# Patient Record
Sex: MALE | Race: WHITE | Employment: OTHER | URBAN - METROPOLITAN AREA
[De-identification: names, ages, dates, MRNs, and addresses within clinical notes are randomized per-mention and may not be internally consistent; named-entity substitution may affect disease eponyms.]

---

## 2017-01-17 ENCOUNTER — TELEPHONE (OUTPATIENT)
Dept: ORTHOPEDIC SURGERY | Age: 67
End: 2017-01-17

## 2017-01-17 NOTE — TELEPHONE ENCOUNTER
Patient missed appointment today because he got lost. [place]. Called to reschedule but he did not want to since [place] is going out of the country soon. He would like a refill of Hydrocodone 10/500. Patient not sure if he is receiving this from Dr. Fatimah Hernandez or pain management. Please call patient at [phone].

## 2017-01-18 NOTE — TELEPHONE ENCOUNTER
We do not give him pain medicine. If he is in pain management, then that is where he should get all his pain medication.

## 2017-01-18 NOTE — TELEPHONE ENCOUNTER
Looking at patients medication list it looks like medication was added as a historical medication meaning Dr. Ana Carvalho did not prescribe. Please advise.

## 2017-01-18 NOTE — TELEPHONE ENCOUNTER
Called patient to inform him of the message below. Informed him that he will need to get main medications from pain management.

## 2017-05-09 ENCOUNTER — OFFICE VISIT (OUTPATIENT)
Dept: ORTHOPEDIC SURGERY | Age: 67
End: 2017-05-09

## 2017-05-09 VITALS
SYSTOLIC BLOOD PRESSURE: 137 MMHG | OXYGEN SATURATION: 95 % | TEMPERATURE: 98.5 F | RESPIRATION RATE: 16 BRPM | HEIGHT: 72 IN | HEART RATE: 89 BPM | BODY MASS INDEX: 29.17 KG/M2 | DIASTOLIC BLOOD PRESSURE: 84 MMHG | WEIGHT: 215.4 LBS

## 2017-05-09 DIAGNOSIS — G89.29 CHRONIC BILATERAL LOW BACK PAIN WITH BILATERAL SCIATICA: ICD-10-CM

## 2017-05-09 DIAGNOSIS — N52.9 ERECTILE DYSFUNCTION, UNSPECIFIED ERECTILE DYSFUNCTION TYPE: ICD-10-CM

## 2017-05-09 DIAGNOSIS — M54.42 CHRONIC BILATERAL LOW BACK PAIN WITH BILATERAL SCIATICA: ICD-10-CM

## 2017-05-09 DIAGNOSIS — M41.50 DEGENERATIVE SCOLIOSIS IN ADULT PATIENT: ICD-10-CM

## 2017-05-09 DIAGNOSIS — M54.41 CHRONIC BILATERAL LOW BACK PAIN WITH BILATERAL SCIATICA: ICD-10-CM

## 2017-05-09 DIAGNOSIS — M48.061 LUMBAR SPINAL STENOSIS: Primary | ICD-10-CM

## 2017-05-09 DIAGNOSIS — R20.2 PARESTHESIA OF LEFT LOWER EXTREMITY: ICD-10-CM

## 2017-05-09 RX ORDER — PSEUDOEPHEDRINE HCL 120 MG
TABLET, EXTENDED RELEASE ORAL
Refills: 6 | COMMUNITY
Start: 2017-05-01 | End: 2019-09-24 | Stop reason: ALTCHOICE

## 2017-05-09 RX ORDER — TADALAFIL 5 MG/1
TABLET, FILM COATED ORAL
Refills: 0 | COMMUNITY
Start: 2017-04-26 | End: 2019-09-24 | Stop reason: ALTCHOICE

## 2017-05-09 NOTE — MR AVS SNAPSHOT
Visit Information Date & Time Provider Department Dept. Phone Encounter #  
 5/9/2017  9:10 AM Madisyn Hussein  [address], [address] [facility] [phone] [id number] Follow-up Instructions Follow-up and Disposition History Your Appointments 6/9/2017 10:00 AM  
SURGERY CONSULT with Madisyn Hussein MD  
[address], [address] [facility]) Appt Note: last seen 9/2016 - discussed sx, pt fell other day, severe back pain; pt rsd to this date [address] [facility] [address]  
[phone]  
  
   
 [address] [facility] [address] Upcoming Health Maintenance Date Due Hepatitis C Screening 1950 DTaP/Tdap/Td series (1 - Tdap) 2/21/1972 FOBT Q 1 YEAR AGE 50-75 2/21/2001 ZOSTER VACCINE AGE 60> 2/21/2011 GLAUCOMA SCREENING Q2Y 2/21/2016 Pneumococcal 65+ Low/Medium Risk (1 of 2 - PCV13) 2/21/2016 MEDICARE YEARLY EXAM 2/21/2016 INFLUENZA AGE 9 TO ADULT 8/1/2017 Allergies as of 5/9/2017  Review Complete On: 5/9/2017 By: Madisyn Hussein MD  
  
 Severity Noted Reaction Type Reactions Darvon [Propoxyphene]  12/10/2012    Nausea and Vomiting Current Immunizations  Never Reviewed No immunizations on file. Not reviewed this visit You Were Diagnosed With   
  
 Codes Comments Lumbar spinal stenosis    -  Primary ICD-10-CM: M48.06 
ICD-9-CM: 724.02 Chronic bilateral low back pain with bilateral sciatica     ICD-10-CM: M54.42, M54.41, G89.29 ICD-9-CM: 724.2, 724.3, 338.29 Degenerative scoliosis in adult patient     ICD-10-CM: M41.50 ICD-9-CM: 733.90, 737.43 Paresthesia of left lower extremity     ICD-10-CM: R20.2 ICD-9-CM: 782.0 Erectile dysfunction, unspecified erectile dysfunction type     ICD-10-CM: N52.9 ICD-9-CM: 607.84 Vitals BP Pulse Temp Resp Height(growth percentile) Weight(growth percentile) 137/84 89 98.5 °F (36.9 °C) (Oral) 16 6' (1.829 m) 215 lb 6.4 oz (97.7 kg) SpO2 BMI Smoking Status 95% 29.21 kg/m2 Never Smoker BMI and BSA Data Body Mass Index Body Surface Area  
 29.21 kg/m 2 2.23 m 2 Preferred Pharmacy Pharmacy Name Phone [facility], [address] [phone] Your Updated Medication List  
  
   
This list is accurate as of: 5/9/17 11:10 AM.  Always use your most recent med list.  
  
  
  
  
 12 HOUR DECONGESTANT 120 mg CR tablet Generic drug:  pseudoephedrine CR  
TAKE 1 TAB BY MOUTH ONCE A DAY. BENADRYL 25 mg capsule Generic drug:  diphenhydrAMINE Take 25 mg by mouth every six (6) hours as needed. buPROPion  mg SR tablet Commonly known as:  Matias Stagger Take 100 mg by mouth three (3) times daily. CIALIS 5 mg tablet Generic drug:  tadalafil TAKE 1 TAB BY MOUTH ONCE A DAY. clonazePAM 1 mg tablet Commonly known as:  Marcellina Comas Take  by mouth three (3) times daily. HYDROcodone-acetaminophen 5-500 mg Cap Take  by mouth.  
  
 meclizine 25 mg Cap Take  by mouth as needed. omeprazole 40 mg capsule Commonly known as:  PRILOSEC Take 40 mg by mouth two (2) times a day. OXYCODONE PO Take  by mouth. We Performed the Following REFERRAL TO UROLOGY [RSQ070 Custom] To-Do List   
 05/09/2017 Imaging:  CT SPINE LUMB WO CONT   
  
 05/09/2017 Neurology:  EMG ONE EXTREMITY LOWER LT Referral Information Referral ID Referred By Referred To  
  
 [id number] Pan COKER Not Available Visits Status Start Date End Date 1 New Request 5/9/17 5/9/18 If your referral has a status of pending review or denied, additional information will be sent to support the outcome of this decision. Patient Instructions MyChart Activation Thank you for requesting access to Or[employer]. Please follow the instructions below to securely access and download your online medical record. Orgenesis allows you to send messages to your doctor, view your test results, renew your prescriptions, schedule appointments, and more. How Do I Sign Up? 1. In your internet browser, go to [email or web address] 
2. Click on the First Time User? Click Here link in the Sign In box. You will be redirect to the New Member Sign Up page. 3. Enter your Orgenesis Access Code exactly as it appears below. You will not need to use this code after youve completed the sign-up process. If you do not sign up before the expiration date, you must request a new code. Orgenesis Access Code: [email or web address] Expires: 2017  8:33 AM (This is the date your Orgenesis access code will ) 4. Enter the last four digits of your Social Security Number (xxxx) and Date of Birth (mm/dd/yyyy) as indicated and click Submit. You will be taken to the next sign-up page. 5. Create a Orgenesis ID. This will be your Orgenesis login ID and cannot be changed, so think of one that is secure and easy to remember. 6. Create a Orgenesis password. You can change your password at any time. 7. Enter your Password Reset Question and Answer. This can be used at a later time if you forget your password. 8. Enter your e-mail address. You will receive e-mail notification when new information is available in [address] 9. Click Sign Up. You can now view and download portions of your medical record. 10. Click the Download Summary menu link to download a portable copy of your medical information. Additional Information If you have questions, please visit the Frequently Asked Questions section of the Orgenesis website at [email or web address] Remember, Orgenesis is NOT to be used for urgent needs. For medical emergencies, dial 911. Introducing [facility] SERVICES! Yancy Ann introduces AppLearn patient portal. Now you can access parts of your medical record, email your doctor's office, and request medication refills online. 1. In your internet browser, go to [email or web address] 2. Click on the First Time User? Click Here link in the Sign In box. You will see the New Member Sign Up page. 3. Enter your AppLearn Access Code exactly as it appears below. You will not need to use this code after youve completed the sign-up process. If you do not sign up before the expiration date, you must request a new code. · AppLearn Access Code: [email or web address] Expires: 8/7/2017  8:33 AM 
 
4. Enter the last four digits of your Social Security Number (xxxx) and Date of Birth (mm/dd/yyyy) as indicated and click Submit. You will be taken to the next sign-up page. 5. Create a AppLearn ID. This will be your AppLearn login ID and cannot be changed, so think of one that is secure and easy to remember. 6. Create a AppLearn password. You can change your password at any time. 7. Enter your Password Reset Question and Answer. This can be used at a later time if you forget your password. 8. Enter your e-mail address. You will receive e-mail notification when new information is available in [address] 9. Click Sign Up. You can now view and download portions of your medical record. 10. Click the Download Summary menu link to download a portable copy of your medical information. If you have questions, please visit the Frequently Asked Questions section of the AppLearn website. Remember, AppLearn is NOT to be used for urgent needs. For medical emergencies, dial 911. Now available from your iPhone and Android! Please provide this summary of care documentation to your next provider. Your primary care clinician is listed as Tereso Fatima. If you have any questions after today's visit, please call [phone].

## 2017-05-09 NOTE — PATIENT INSTRUCTIONS
Ceon Activation    Thank you for requesting access to Ceon. Please follow the instructions below to securely access and download your online medical record. Ceon allows you to send messages to your doctor, view your test results, renew your prescriptions, schedule appointments, and more. How Do I Sign Up? 1. In your internet browser, go to [email or web address] Assistant  2. Click on the First Time User? Click Here link in the Sign In box. You will be redirect to the New Member Sign Up page. 3. Enter your Ceon Access Code exactly as it appears below. You will not need to use this code after youve completed the sign-up process. If you do not sign up before the expiration date, you must request a new code. Ceon Access Code: [email or web address]  Expires: 2017  8:33 AM (This is the date your Ceon access code will )    4. Enter the last four digits of your Social Security Number (xxxx) and Date of Birth (mm/dd/yyyy) as indicated and click Submit. You will be taken to the next sign-up page. 5. Create a Ceon ID. This will be your Ceon login ID and cannot be changed, so think of one that is secure and easy to remember. 6. Create a Ceon password. You can change your password at any time. 7. Enter your Password Reset Question and Answer. This can be used at a later time if you forget your password. 8. Enter your e-mail address. You will receive e-mail notification when new information is available in [address] 9. Click Sign Up. You can now view and download portions of your medical record. 10. Click the Download Summary menu link to download a portable copy of your medical information. Additional Information    If you have questions, please visit the Frequently Asked Questions section of the Ceon website at [email or web address] Remember, Ceon is NOT to be used for urgent needs. For medical emergencies, dial 911.

## 2017-05-09 NOTE — PROGRESS NOTES
[address]  [address]  [place], [address] [place] [address] Street  Phone: [phone]  Fax: [phone]  PROGRESS NOTE  Patient: Judi Lobato                MRN: [id number]       SSN: [id number]  YOB: 1950        AGE: 78 y.o. SEX: male  Body mass index is 29.21 kg/(m^2). PCP: Marcela Givens MD  05/09/17    Chief Complaint   Patient presents with    Back Pain     back pain eval       HISTORY OF PRESENT ILLNESS, RADIOGRAPHS, and PLAN:     HISTORY:  Mr. Theodora Michaud returns today. He has continued to have severe pain in his back and has complaints of leg symptoms. Now, he says last week, he had an episode of pain in his back that lasted two days with radiating left leg numbness and pain. The numbness is circumferential in his left leg. The pain, he says, left him immobilized for two days in his back. He gradually improved. He denies bowel or bladder dysfunction. His physical exam demonstrates no objective neurology. The CT scan I had ordered last time never occurred. I tried to discuss the matter with him. I reviewed his x-rays. I am not enthusiastic about him being a reconstructive candidate. He has a L4-5 level that is fused. He has a degenerative scoliosis, mostly a coronal imbalance more than a sagittal imbalance. 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 His MRI does not show me any dramatic stenosis at any place. I cannot explain his left leg numbness or anything looking at his spine that would cause such an acute, sudden pain syndrome on him awaking from his bed. My clinical sense is that he would be a poor surgical candidate for any intervention, certainly, a multilevel lumbar decompression and fusion where there is not much that I am decompressing, and I am trying to refuse segments that have nearly autofused themselves.       ASSESSMENT/PLAN: I am going to see him back following an EMG of his left lower extremity, as well as the CT scan I had previously ordered. cc: Kaitlynn Ruth M.D. Past Medical History:   Diagnosis Date    Back pain, chronic     Bilateral hip pain     Colon disorder     Spastic    Gastric ulcer     H/O emotional problems     Herniated disc     Hypertension     Knee pain     Low back pain     Vertigo        History reviewed. No pertinent family history. Current Outpatient Prescriptions   Medication Sig Dispense Refill    CIALIS 5 mg tablet TAKE 1 TAB BY MOUTH ONCE A DAY. 0    OXYCODONE HCL (OXYCODONE PO) Take  by mouth.  buPROPion SR (WELLBUTRIN SR) 100 mg SR tablet Take 100 mg by mouth three (3) times daily.  clonazePAM (KLONOPIN) 1 mg tablet Take  by mouth three (3) times daily.  omeprazole (PRILOSEC) 40 mg capsule Take 40 mg by mouth two (2) times a day.  12 HOUR DECONGESTANT 120 mg CR tablet TAKE 1 TAB BY MOUTH ONCE A DAY. 6    HYDROcodone-acetaminophen 5-500 mg cap Take  by mouth.  meclizine 25 mg cap Take  by mouth as needed.  diphenhydrAMINE (BENADRYL) 25 mg capsule Take 25 mg by mouth every six (6) hours as needed.            Allergies   Allergen Reactions    Darvon [Propoxyphene] Nausea and Vomiting       Past Surgical History:   Procedure Laterality Date    HX BACK SURGERY  06/03/1999    HX BACK SURGERY  05/22/2015    HX HEENT      Nasal    HX KNEE ARTHROSCOPY      Rt knee    HX ORTHOPAEDIC      HX ORTHOPAEDIC      Lt ankle    HX SHOULDER ARTHROSCOPY      Rt shoulder       Past Medical History:   Diagnosis Date    Back pain, chronic     Bilateral hip pain     Colon disorder     Spastic    Gastric ulcer     H/O emotional problems     Herniated disc     Hypertension     Knee pain     Low back pain     Vertigo        Social History     Social History    Marital status:      Spouse name: N/A    Number of children: N/A    Years of education: N/A Occupational History    Not on file. Social History Main Topics    Smoking status: Never Smoker    Smokeless tobacco: Not on file    Alcohol use No    Drug use: Not on file    Sexual activity: Not on file     Other Topics Concern    Not on file     Social History Narrative     REVIEW OF SYSTEMS:   CONSTITUTIONAL SYMPTOMS:  Negative. EYES:  Negative. EARS, NOSE, THROAT AND MOUTH:  Negative. CARDIOVASCULAR:  Negative. RESPIRATORY:  Negative. GENITOURINARY: Negative. GASTROINTESTINAL:  Negative. INTEGUMENTARY (SKIN AND/OR BREAST):  Negative. MUSCULOSKELETAL: Per HPI.   ENDOCRINE/RHEUMATOLOGIC:  Negative. NEUROLOGICAL:  Per HPI. HEMATOLOGIC/LYMPHATIC:  Negative. ALLERGIC/IMMUNOLOGIC:  Negative. PSYCHIATRIC:  Negative. PHYSICAL EXAMINATION:   Visit Vitals    /84    Pulse 89    Temp 98.5 °F (36.9 °C) (Oral)    Resp 16    Ht 6' (1.829 m)    Wt 215 lb 6.4 oz (97.7 kg)    SpO2 95%    BMI 29.21 kg/m2    PAIN SCALE: 8/10    CONSTITUTIONAL: The patient is in no apparent distress and is alert and oriented x 3. HEENT: Normocephalic. Hearing grossly intact. NECK: Supple and symmetric. no tenderness, or masses were felt. RESPIRATORY: No labored breathing. CARDIOVASCULAR: The carotid pulses were normal. Peripheral pulses were 2+. CHEST: Normal AP diameter and normal contour without any kyphoscoliosis. LYMPHATIC: No lymphadenopathy was appreciated in the neck, axillae or groin. SKIN:  Negative for scars, rashes, lesions, or ulcers on the right upper, right lower, left upper, left lower and trunk. NEUROLOGICAL: Alert and oriented x 3. Ambulation without assistive device. FWB. EXTREMITIES: See musculoskeletal.  MUSCULOSKELETAL:   Head and Neck:  Negative for misalignment, asymmetry, crepitation, defects, tenderness masses or effusions.  Left Upper Extremity: Inspection, percussion and palpation preformed. Angeless sign is negative.    Right Upper Extremity: Inspection, percussion and palpation preformed. Angeless sign is negative.  Spine, Ribs and Pelvis: Severe back pain with radiating BLE pain. Inspection, percussion and palpation preformed. Negative for misalignment, asymmetry, crepitation, defects, tenderness masses or effusions.  Left Lower Extremity: Posterior pain. Paresthesia with sitting. Inspection, percussion and palpation preformed. Negative straight leg raise.  Right Lower Extremity: Posterior pain. Inspection, percussion and palpation preformed. Negative straight leg raise. SPINE EXAM:     Lumbar spine: No rash, ecchymosis, or gross obliquity. No focal atrophy is noted. ASSESSMENT    ICD-10-CM ICD-9-CM    1. Lumbar spinal stenosis M48.06 724.02 CT SPINE LUMB WO CONT   2. Chronic bilateral low back pain with sciatica M54.42 724.2 CT SPINE LUMB WO CONT    M54.41 724.3 EMG ONE EXTREMITY LOWER LT    G89.29 338.29    3. Degenerative scoliosis in adult patient M41.50 733.90 CT SPINE LUMB WO CONT     737.43    4. Paresthesia of left lower extremity R20.2 782.0 EMG ONE EXTREMITY LOWER LT       Written by Binu Reynoso, as dictated by Brisa Joe MD.    I, Dr. Brisa Joe MD, confirm that all documentation is accurate.

## 2017-05-22 ENCOUNTER — TELEPHONE (OUTPATIENT)
Dept: ORTHOPEDIC SURGERY | Age: 67
End: 2017-05-22

## 2017-09-06 ENCOUNTER — DOCUMENTATION ONLY (OUTPATIENT)
Dept: ORTHOPEDIC SURGERY | Age: 67
End: 2017-09-06

## 2017-09-19 DIAGNOSIS — M54.42 CHRONIC BILATERAL LOW BACK PAIN WITH BILATERAL SCIATICA: ICD-10-CM

## 2017-09-19 DIAGNOSIS — M48.061 LUMBAR SPINAL STENOSIS: ICD-10-CM

## 2017-09-19 DIAGNOSIS — M54.41 CHRONIC BILATERAL LOW BACK PAIN WITH BILATERAL SCIATICA: ICD-10-CM

## 2017-09-19 DIAGNOSIS — M41.50 DEGENERATIVE SCOLIOSIS IN ADULT PATIENT: ICD-10-CM

## 2017-09-19 DIAGNOSIS — G89.29 CHRONIC BILATERAL LOW BACK PAIN WITH BILATERAL SCIATICA: ICD-10-CM

## 2017-10-03 ENCOUNTER — TELEPHONE (OUTPATIENT)
Dept: ORTHOPEDIC SURGERY | Age: 67
End: 2017-10-03

## 2017-10-03 ENCOUNTER — OFFICE VISIT (OUTPATIENT)
Dept: ORTHOPEDIC SURGERY | Age: 67
End: 2017-10-03

## 2017-10-03 VITALS
WEIGHT: 220.4 LBS | SYSTOLIC BLOOD PRESSURE: 111 MMHG | BODY MASS INDEX: 29.85 KG/M2 | RESPIRATION RATE: 16 BRPM | HEIGHT: 72 IN | TEMPERATURE: 98.5 F | HEART RATE: 82 BPM | OXYGEN SATURATION: 97 % | DIASTOLIC BLOOD PRESSURE: 70 MMHG

## 2017-10-03 DIAGNOSIS — M41.50 DEGENERATIVE SCOLIOSIS IN ADULT PATIENT: ICD-10-CM

## 2017-10-03 DIAGNOSIS — M48.02 CERVICAL SPINAL STENOSIS: ICD-10-CM

## 2017-10-03 DIAGNOSIS — M54.16 LUMBAR RADICULOPATHY: ICD-10-CM

## 2017-10-03 DIAGNOSIS — M48.062 SPINAL STENOSIS OF LUMBAR REGION WITH NEUROGENIC CLAUDICATION: Primary | ICD-10-CM

## 2017-10-03 RX ORDER — GABAPENTIN 300 MG/1
CAPSULE ORAL
Qty: 90 CAP | Refills: 1 | Status: SHIPPED | OUTPATIENT
Start: 2017-10-03 | End: 2018-10-29 | Stop reason: ALTCHOICE

## 2017-10-03 NOTE — PROGRESS NOTES
[address]  [address]  [place], [address] [place] [address] Street  Phone: [phone]  Fax: [phone]  PROGRESS NOTE  Patient: Tyson Parra                MRN: [id number]       SSN: [id number]  YOB: 1950        AGE: 78 y.o. SEX: male  Body mass index is 29.89 kg/(m^2). PCP: George Arriaga MD  10/03/17    No chief complaint on file. HISTORY OF PRESENT ILLNESS, RADIOGRAPHS, and PLAN:     Dictation #1  IGL:[id number]  BLO:[id number]      Past Medical History:   Diagnosis Date    Back pain, chronic     Bilateral hip pain     Colon disorder     Spastic    Erectile dysfunction     Gastric ulcer     H/O emotional problems     Herniated disc     Hypertension     Knee pain     Low back pain     Vertigo        History reviewed. No pertinent family history. Current Outpatient Prescriptions   Medication Sig Dispense Refill    12 HOUR DECONGESTANT 120 mg CR tablet TAKE 1 TAB BY MOUTH ONCE A DAY. 6    CIALIS 5 mg tablet TAKE 1 TAB BY MOUTH ONCE A DAY. 0    OXYCODONE HCL (OXYCODONE PO) Take  by mouth.  HYDROcodone-acetaminophen 5-500 mg cap Take  by mouth.  buPROPion SR (WELLBUTRIN SR) 100 mg SR tablet Take 100 mg by mouth three (3) times daily.  clonazePAM (KLONOPIN) 1 mg tablet Take  by mouth three (3) times daily.  omeprazole (PRILOSEC) 40 mg capsule Take 40 mg by mouth two (2) times a day.  diphenhydrAMINE (BENADRYL) 25 mg capsule Take 25 mg by mouth every six (6) hours as needed.            Allergies   Allergen Reactions    Latex, Natural Rubber Other (comments)    Darvon [Propoxyphene] Nausea and Vomiting    Shellfish Containing Products Unknown (comments)       Past Surgical History:   Procedure Laterality Date    HX BACK SURGERY  06/03/1999    HX BACK SURGERY  05/22/2015    HX HEENT      Nasal    HX KNEE ARTHROSCOPY      Rt knee    HX ORTHOPAEDIC      HX ORTHOPAEDIC      Lt ankle    HX SHOULDER ARTHROSCOPY      Rt shoulder       Past Medical History:   Diagnosis Date    Back pain, chronic     Bilateral hip pain     Colon disorder     Spastic    Erectile dysfunction     Gastric ulcer     H/O emotional problems     Herniated disc     Hypertension     Knee pain     Low back pain     Vertigo        Social History     Social History    Marital status:      Spouse name: N/A    Number of children: N/A    Years of education: N/A     Occupational History    Not on file. Social History Main Topics    Smoking status: Never Smoker    Smokeless tobacco: Never Used    Alcohol use Yes      Comment: rare    Drug use: No    Sexual activity: Not on file     Other Topics Concern    Not on file     Social History Narrative         REVIEW OF SYSTEMS:   CONSTITUTIONAL SYMPTOMS:  Negative. EYES:  Negative. EARS, NOSE, THROAT AND MOUTH:  Negative. CARDIOVASCULAR:  Negative. RESPIRATORY:  Negative. GENITOURINARY: Negative. GASTROINTESTINAL:  Negative. INTEGUMENTARY (SKIN AND/OR BREAST):  Negative. MUSCULOSKELETAL: Per HPI.   ENDOCRINE/RHEUMATOLOGIC:  Negative. NEUROLOGICAL:  Per HPI. HEMATOLOGIC/LYMPHATIC:  Negative. ALLERGIC/IMMUNOLOGIC:  Negative. PSYCHIATRIC:  Negative. PHYSICAL EXAMINATION:   Visit Vitals    /70    Pulse 82    Temp 98.5 °F (36.9 °C) (Oral)    Resp 16    Ht 6' (1.829 m)    Wt 220 lb 6.4 oz (100 kg)    SpO2 97%    BMI 29.89 kg/m2    PAIN SCALE: 9/10    CONSTITUTIONAL: The patient is in no apparent distress and is alert and oriented x 3. HEENT: Normocephalic. Hearing grossly intact. NECK: Supple and symmetric. no tenderness, or masses were felt. RESPIRATORY: No labored breathing. CARDIOVASCULAR: The carotid pulses were normal. Peripheral pulses were 2+. CHEST: Normal AP diameter and normal contour without any kyphoscoliosis. LYMPHATIC: No lymphadenopathy was appreciated in the neck, axillae or groin.   SKIN: . Negative for scars, rashes, lesions, or ulcers on the right upper, right lower, left upper, left lower and trunk. NEUROLOGICAL: Alert and oriented x 3. Ambulation without assistive device. FWB. EXTREMITIES: See musculoskeletal.  MUSCULOSKELETAL:   Head and Neck:  Negative for misalignment, asymmetry, crepitation, defects, tenderness masses or effusions.  Left Upper Extremity: Inspection, percussion and palpation preformed. Angeless sign is negative.  Right Upper Extremity: Inspection, percussion and palpation preformed. Angeless sign is negative.  Spine, Ribs and Pelvis: Severe back pain with radiating BLE pain. Inspection, percussion and palpation preformed. Negative for misalignment, asymmetry, crepitation, defects, tenderness masses or effusions.  Left Lower Extremity: radiating pain. Paresthesias with sitting. Inspection, percussion and palpation preformed. Negative straight leg raise.  Right Lower Extremity: Radiating pain. Inspection, percussion and palpation preformed. Negative straight leg raise. SPINE EXAM:     Lumbar spine: No rash, ecchymosis, or gross obliquity. No focal atrophy is noted. ASSESSMENT    ICD-10-CM ICD-9-CM    1. Spinal stenosis of lumbar region with neurogenic claudication M48.062 724.03    2. Degenerative scoliosis in adult patient M41.50 733.90      737.43        Written by Justina Frazier, as dictated by Naa Gómez MD.    I, Dr. Naa Gómez MD, confirm that all documentation is accurate.

## 2017-10-03 NOTE — PROGRESS NOTES
[address]  [address]  [facility], [address]  Phone: [phone]  Fax: [phone]  PROGRESS NOTE  Patient: Sudeep Moreau                MRN: [id number]       SSN: [id number]  YOB: 1950        AGE: 78 y.o. SEX: male  Body mass index is 29.89 kg/(m^2). PCP: Nicholas Mathias MD  10/03/17    Chief Complaint   Patient presents with    Back Pain     both sides lower back    Neck Pain     ROM limited    Shoulder Pain     torn rotator cuff in left shoulder    Arm Pain     intermittent numbness from elbow down    Leg Pain     numbness in legs intermittenly       HISTORY OF PRESENT ILLNESS, RADIOGRAPHS, and PLAN:     HISTORY:  Mr. Janae Schroeder returns today. He is continuing to have complaints of severe back pain and complaints of a sense of worsening of his spinal deformity. He complains of leg numbness, bilateral.  He also is complaining of increasing neck pain. I have nothing new objective in his exam, though he stands with an accentuated loss of sagittal balance and coronal balance. I reviewed his CT. It is stable. He has a fused L4-5 surgically, an L3-4 that is autofused with an angular deformity, and degenerative change. He is requesting surgery on his back. He says his back hurts and he is having increasing deformity, and he cannot stand or sit straight up any longer. ASSESSMENT/PLAN: From my purview, radiographically, his back has been unchanged for years though he is standing and walking as if he is having a truly more severe spinal deformity. I cannot quite explain his clinical manifestation of his radiographic deformity. I had scheduled him for an EMG. It does not appear to have been done. We are investigating it. I am going to get a new MRI of his cervical and lumbar spine. It has been over one year.   The last studies did not appear to demonstrate any significant stenosis that would explain the numbness he is describing. I attempted to explain to him that to resolve the spinal deformity that he has, I believe would take a spinal osteotomy with a probable extensive lumbosacral, if not thoracolumbosacral, fusion. He has this fused marginal osteophyte laterally at L3-4. It would have to be resected. It is an extensive surgery at his age. I do not think it would have anything to do with the sensation of numbness he has in his legs to address his back pain. It would both have to address the issue of deformity that he is having, and the just diffuse degenerative change that he has, and I am uncertain if anything would truly address his overall complaints of back pain in my now 15 years of experience with the gentleman. I have explained to him that do address his deformity, I very well may refer him to the [facility] for such deformity, revision surgery. At this point, I am going to obtain for him, the EMG study and a current MRI so I could have all of the investigations at hand before further decision making. cc: Nicola Potts MD         Past Medical History:   Diagnosis Date    Back pain, chronic     Bilateral hip pain     Colon disorder     Spastic    Erectile dysfunction     Gastric ulcer     H/O emotional problems     Herniated disc     Hypertension     Knee pain     Low back pain     Vertigo        History reviewed. No pertinent family history. Current Outpatient Prescriptions   Medication Sig Dispense Refill    12 HOUR DECONGESTANT 120 mg CR tablet TAKE 1 TAB BY MOUTH ONCE A DAY. 6    CIALIS 5 mg tablet TAKE 1 TAB BY MOUTH ONCE A DAY. 0    OXYCODONE HCL (OXYCODONE PO) Take  by mouth.  HYDROcodone-acetaminophen 5-500 mg cap Take  by mouth.  buPROPion SR (WELLBUTRIN SR) 100 mg SR tablet Take 100 mg by mouth three (3) times daily.  clonazePAM (KLONOPIN) 1 mg tablet Take  by mouth three (3) times daily.         omeprazole (PRILOSEC) 40 mg capsule Take 40 mg by mouth two (2) times a day.  diphenhydrAMINE (BENADRYL) 25 mg capsule Take 25 mg by mouth every six (6) hours as needed. Allergies   Allergen Reactions    Latex, Natural Rubber Other (comments)    Darvon [Propoxyphene] Nausea and Vomiting    Shellfish Containing Products Unknown (comments)       Past Surgical History:   Procedure Laterality Date    HX BACK SURGERY  06/03/1999    HX BACK SURGERY  05/22/2015    HX HEENT      Nasal    HX KNEE ARTHROSCOPY      Rt knee    HX ORTHOPAEDIC      HX ORTHOPAEDIC      Lt ankle    HX SHOULDER ARTHROSCOPY      Rt shoulder       Past Medical History:   Diagnosis Date    Back pain, chronic     Bilateral hip pain     Colon disorder     Spastic    Erectile dysfunction     Gastric ulcer     H/O emotional problems     Herniated disc     Hypertension     Knee pain     Low back pain     Vertigo        Social History     Social History    Marital status:      Spouse name: N/A    Number of children: N/A    Years of education: N/A     Occupational History    Not on file. Social History Main Topics    Smoking status: Never Smoker    Smokeless tobacco: Never Used    Alcohol use Yes      Comment: rare    Drug use: No    Sexual activity: Not on file     Other Topics Concern    Not on file     Social History Narrative         REVIEW OF SYSTEMS:   CONSTITUTIONAL SYMPTOMS:  Negative. EYES:  Negative. EARS, NOSE, THROAT AND MOUTH:  Negative. CARDIOVASCULAR:  Negative. RESPIRATORY:  Negative. GENITOURINARY: Negative. GASTROINTESTINAL:  Negative. INTEGUMENTARY (SKIN AND/OR BREAST):  Negative. MUSCULOSKELETAL: Per HPI.   ENDOCRINE/RHEUMATOLOGIC:  Negative. NEUROLOGICAL:  Per HPI. HEMATOLOGIC/LYMPHATIC:  Negative. ALLERGIC/IMMUNOLOGIC:  Negative. PSYCHIATRIC:  Negative.     PHYSICAL EXAMINATION:   Visit Vitals    /70    Pulse 82    Temp 98.5 °F (36.9 °C) (Oral)    Resp 16    Ht 6' (1.829 m)    Wt 220 lb 6.4 oz (100 kg)    SpO2 97%    BMI 29.89 kg/m2    PAIN SCALE: 9/10    CONSTITUTIONAL: The patient is in no apparent distress and is alert and oriented x 3. HEENT: Normocephalic. Hearing grossly intact. NECK: Supple and symmetric. no tenderness, or masses were felt. RESPIRATORY: No labored breathing. CARDIOVASCULAR: The carotid pulses were normal. Peripheral pulses were 2+. CHEST: Normal AP diameter and normal contour without any kyphoscoliosis. LYMPHATIC: No lymphadenopathy was appreciated in the neck, axillae or groin. SKIN: Negative for scars, rashes, lesions, or ulcers on the right upper, right lower, left upper, left lower and trunk. NEUROLOGICAL: Alert and oriented x 3. Difficulty with balance. Ambulation without assistive device. FWB. EXTREMITIES: See musculoskeletal.  MUSCULOSKELETAL:   Head and Neck: Neck pain, crepitus with ROM. Negative for misalignment, asymmetry, defects, tenderness masses or effusions.  Left Upper Extremity: Inspection, percussion and palpation preformed. Angeless sign is negative.  Right Upper Extremity: Inspection, percussion and palpation preformed. Angeless sign is negative.  Spine, Ribs and Pelvis: Back pain with radiating BLE pain. Inspection, percussion and palpation preformed. Negative for misalignment, asymmetry, crepitation, defects, tenderness masses or effusions.  Left Lower Extremity: Radiating pain. Paresthesias, L>R. Inspection, percussion and palpation preformed. Negative straight leg raise.  Right Lower Extremity: Radiating pain. Paresthesias, L>R. Inspection, percussion and palpation preformed. Negative straight leg raise. SPINE EXAM:     Lumbar spine: No rash, ecchymosis, or gross obliquity. No focal atrophy is noted. ASSESSMENT    ICD-10-CM ICD-9-CM    1. Spinal stenosis of lumbar region with neurogenic claudication M48.062 724.03 MRI LUMB SPINE W WO CONT   2.  Degenerative scoliosis in adult patient M41.50 733.90 MRI LUMB SPINE W WO CONT     737.43    3. Cervical spinal stenosis M48.02 723.0 MRI CERV SPINE WO CONT   4. Lumbar radiculopathy M54.16 724.4 MRI LUMB SPINE W WO CONT       Written by Robyn Agarwal, as dictated by Tyrone Jones MD.    I, Dr. Tyrone Jones MD, confirm that all documentation is accurate.

## 2017-10-03 NOTE — MR AVS SNAPSHOT
Visit Information Date & Time Provider Department Dept. Phone Encounter #  
 10/3/2017  9:45 AM Tavia Johnson MD [facility], [address] [facility] [phone] [id number] Follow-up Instructions Return for MRI/CT f/u. Follow-up and Disposition History Upcoming Health Maintenance Date Due Hepatitis C Screening 1950 DTaP/Tdap/Td series (1 - Tdap) 2/21/1972 FOBT Q 1 YEAR AGE 50-75 2/21/2001 ZOSTER VACCINE AGE 60> 12/21/2010 GLAUCOMA SCREENING Q2Y 2/21/2016 Pneumococcal 65+ Low/Medium Risk (1 of 2 - PCV13) 2/21/2016 MEDICARE YEARLY EXAM 2/21/2016 INFLUENZA AGE 9 TO ADULT 8/1/2017 Allergies as of 10/3/2017  Review Complete On: 10/3/2017 By: Jersey Torrez Severity Noted Reaction Type Reactions Latex, Natural Rubber  06/05/2015    Other (comments) Darvon [Propoxyphene]  12/10/2012    Nausea and Vomiting Shellfish Containing Products  08/10/2013    Unknown (comments) Current Immunizations  Never Reviewed No immunizations on file. Not reviewed this visit You Were Diagnosed With   
  
 Codes Comments Spinal stenosis of lumbar region with neurogenic claudication    -  Primary ICD-10-CM: U92.351 
ICD-9-CM: 724.03 Degenerative scoliosis in adult patient     ICD-10-CM: M41.50 ICD-9-CM: 733.90, 737.43 Cervical spinal stenosis     ICD-10-CM: M48.02 
ICD-9-CM: 723.0 Lumbar radiculopathy     ICD-10-CM: M54.16 
ICD-9-CM: 724.4 Vitals BP Pulse Temp Resp Height(growth percentile) Weight(growth percentile) 111/70 82 98.5 °F (36.9 °C) (Oral) 16 6' (1.829 m) 220 lb 6.4 oz (100 kg) SpO2 BMI Smoking Status 97% 29.89 kg/m2 Never Smoker BMI and BSA Data Body Mass Index Body Surface Area  
 29.89 kg/m 2 2.25 m 2 Preferred Pharmacy Pharmacy Name Phone [facility] [place] [phone] Your Updated Medication List  
 This list is accurate as of: 10/3/17 10:26 AM.  Always use your most recent med list.  
  
  
  
  
 12 HOUR DECONGESTANT 120 mg CR tablet Generic drug:  pseudoephedrine CR  
TAKE 1 TAB BY MOUTH ONCE A DAY. BENADRYL 25 mg capsule Generic drug:  diphenhydrAMINE Take 25 mg by mouth every six (6) hours as needed. buPROPion  mg SR tablet Commonly known as:  Elba Vasquez Take 100 mg by mouth three (3) times daily. CIALIS 5 mg tablet Generic drug:  tadalafil TAKE 1 TAB BY MOUTH ONCE A DAY. clonazePAM 1 mg tablet Commonly known as:  Wilhelmena Listen Take  by mouth three (3) times daily. gabapentin 300 mg capsule Commonly known as:  NEURONTIN Take 1 Tab QHS x1 week, then BID x1 week, then TID  Indications: NEUROPATHIC PAIN  
  
 HYDROcodone-acetaminophen 5-500 mg Cap Take  by mouth. omeprazole 40 mg capsule Commonly known as:  PRILOSEC Take 40 mg by mouth two (2) times a day. OXYCODONE PO Take  by mouth. Prescriptions Sent to Pharmacy Refills  
 gabapentin (NEURONTIN) 300 mg capsule 1 Sig: Take 1 Tab QHS x1 week, then BID x1 week, then TID  Indications: NEUROPATHIC PAIN Class: Normal  
 Pharmacy: [facility] [id number], [address]  #: [phone] Follow-up Instructions Return for MRI/CT f/u. To-Do List   
 10/03/2017 Imaging:  MRI CERV SPINE WO CONT   
  
 10/03/2017 Imaging:  MRI LUMB SPINE W WO CONT Introducing hospitals & HEALTH SERVICES! [facility] introduces [employer] patient portal. Now you can access parts of your medical record, email your doctor's office, and request medication refills online. 1. In your internet browser, go to [email or web address] 2. Click on the First Time User? Click Here link in the Sign In box. You will see the New Member Sign Up page. 3. Enter your [employer] Access Code exactly as it appears below.  You will not need to use this code after youve completed the sign-up process. If you do not sign up before the expiration date, you must request a new code. · [employer] Access Code: [email or web address] Expires: 11/14/2017  2:58 PM 
 
4. Enter the last four digits of your Social Security Number (xxxx) and Date of Birth (mm/dd/yyyy) as indicated and click Submit. You will be taken to the next sign-up page. 5. Create a [employer] ID. This will be your [employer] login ID and cannot be changed, so think of one that is secure and easy to remember. 6. Create a [employer] password. You can change your password at any time. 7. Enter your Password Reset Question and Answer. This can be used at a later time if you forget your password. 8. Enter your e-mail address. You will receive e-mail notification when new information is available in [address]. 9. Click Sign Up. You can now view and download portions of your medical record. 10. Click the Download Summary menu link to download a portable copy of your medical information. If you have questions, please visit the Frequently Asked Questions section of the [employer] website. Remember, [employer] is NOT to be used for urgent needs. For medical emergencies, dial 911. Now available from your iPhone and Android! Please provide this summary of care documentation to your next provider. Your primary care clinician is listed as Afshin Arce. If you have any questions after today's visit, please call [phone].

## 2018-10-19 ENCOUNTER — TELEPHONE (OUTPATIENT)
Dept: ORTHOPEDIC SURGERY | Age: 68
End: 2018-10-19

## 2018-10-29 ENCOUNTER — OFFICE VISIT (OUTPATIENT)
Dept: ORTHOPEDIC SURGERY | Age: 68
End: 2018-10-29

## 2018-10-29 VITALS
DIASTOLIC BLOOD PRESSURE: 91 MMHG | HEIGHT: 70 IN | BODY MASS INDEX: 31.75 KG/M2 | WEIGHT: 221.8 LBS | TEMPERATURE: 98.5 F | OXYGEN SATURATION: 95 % | RESPIRATION RATE: 22 BRPM | SYSTOLIC BLOOD PRESSURE: 126 MMHG | HEART RATE: 141 BPM

## 2018-10-29 DIAGNOSIS — M48.062 SPINAL STENOSIS OF LUMBAR REGION WITH NEUROGENIC CLAUDICATION: Primary | ICD-10-CM

## 2018-10-29 DIAGNOSIS — M41.50 DEGENERATIVE SCOLIOSIS IN ADULT PATIENT: ICD-10-CM

## 2018-10-29 DIAGNOSIS — M48.02 CERVICAL SPINAL STENOSIS: ICD-10-CM

## 2018-10-29 DIAGNOSIS — M79.2 NEUROPATHIC PAIN: ICD-10-CM

## 2018-10-29 RX ORDER — GABAPENTIN 300 MG/1
CAPSULE ORAL
Qty: 90 CAP | Refills: 1 | Status: SHIPPED | OUTPATIENT
Start: 2018-10-29 | End: 2019-09-24 | Stop reason: ALTCHOICE

## 2018-10-29 NOTE — PATIENT INSTRUCTIONS
Magnetic Resonance Imaging (MRI): About This Test  What is it? Magnetic resonance imaging (MRI) is a test that uses a magnetic field and pulses of radio wave energy to make pictures of organs and structures inside the body. When you have an MRI, you lie on a table and your body is moved into the MRI machine, where an image is taken of the area of the body being studied. Why is this test done? You may have an MRI for many reasons. This test can find problems such as tumors, bleeding, injury, blood vessel disease, and infection. An MRI also may provide more information about a problem seen on an X-ray, ultrasound scan, CT scan, or nuclear medicine exam.  How can you prepare for the test?  Talk to your doctor about all your health conditions before the test. For example, tell your doctor if:  · You are allergic to any medicines. · You are or might be pregnant. · You have a pacemaker, an artificial limb, any metal pins or metal parts in your body, metal heart valves, metal clips in your brain, metal implants in your ears, or any other implanted or prosthetic medical device. · You have an intrauterine device (IUD) in place. · You get nervous in confined spaces. You may need medicine to help you relax. · You wear any patches that contain medicine. · You have kidney disease. What happens before the test?  · You will remove all metal objects from your body. These include hearing aids, dentures, jewelry, watches, and hairpins. · You will take off all or most of your clothes and then change into a gown. · If you do leave some clothes on, make sure you take everything out of your pockets. · You may have contrast materials (dye) put into your arm through a tube called an IV. Contrast material helps doctors see specific organs, blood vessels, and most tumors. What happens during the test?  · You will lie on your back on a table that is part of the MRI scanner.   · The table will slide into the space that contains the magnet. · Inside the scanner you will hear a fan and feel air moving. You may hear tapping, thumping, or snapping noises. You may be given earplugs or headphones to reduce the noise. · You will be asked to hold still during the scan. You may be asked to hold your breath for short periods. · You may be alone in the scanning room, but a technologist will be watching you through a window and talking with you during the test.  What else should you know about the test?  · An MRI does not hurt. · You may feel warmth in the area being examined. This is normal.  · A dye (contrast material) that contains gadolinium may be used in this test. Be sure to tell your doctor if:  ? You are pregnant or think you may be pregnant. ? You have kidney problems. ? You've had more than one test that used gadolinium. · The U.S. Food and Drug Administration (FDA) has safety warnings about gadolinium. But for most people, the benefit of its use in this test outweighs the risk. · If a dye is used, you may feel a quick sting or pinch and some coolness when the IV is started. The dye may give you a metallic taste in your mouth. Some people feel sick to their stomach or get a headache. · If you breastfeed and are concerned about whether the dye used in this test is safe, talk to your doctor. Most experts believe that very little dye passes into breast milk and even less is passed on to the baby. But if you prefer, you can store some of your breast milk ahead of time and use it for a day or two after the test.  How long does the test take? · The test usually takes 30 to 60 minutes but can take as long as 2 hours. What happens after the test?  · You will probably be able to go home right away, depending on the reason for the test.  Follow-up care is a key part of your treatment and safety. Be sure to make and go to all appointments, and call your doctor if you are having problems.  It's also a good idea to keep a list of the medicines you take. Ask your doctor when you can expect to have your test results. Where can you learn more? Go to http://nghia-carolyn.info/. Enter V016 in the search box to learn more about \"Magnetic Resonance Imaging (MRI): About This Test.\"  Current as of: June 26, 2018  Content Version: 11.8  © 8496-5906 [employer]. Care instructions adapted under license by [employer] (which disclaims liability or warranty for this information). If you have questions about a medical condition or this instruction, always ask your healthcare professional. [address] any warranty or liability for your use of this information.

## 2018-10-29 NOTE — PROGRESS NOTES
[address]  [address]  [place], [address] [place] [address] Street  Phone: [phone]  Fax: [phone]  PROGRESS NOTE  Patient: Zack Nguyễn                MRN: [id number]       SSN: [id number]  YOB: 1950        AGE: 80 y.o. SEX: male  Body mass index is 31.82 kg/m². PCP: No primary care provider on file. 10/29/18    Chief Complaint   Patient presents with    Back Pain     Lower     Hip Pain     Bilateral     Leg Pain     Bilateral     Follow-up       HISTORY OF PRESENT ILLNESS:  Zack Nguyễn is a 80 y.o.  male with history of chronic neck and back pain for several years and radiation of pain to BLE-globally. Prior history of neck and back problems: mild stenosis and disc bulges at C6-7 per MRI 09/20/16 recurrent self limited episodes of low back pain in the past and previous spinal surgery - L4-5 Fusion. CT 08/28/17 per Dr. Tianna Lane \"It is stable. He has a fused L4-5 surgically, an L3-4 that is autofused with an angular deformity, and degenerative change. \" His last MRI on 09/20/16 was also stable with mild stenosis above his surgery. He was last seen by Dr. Tianna Lane a year ago. He wanted to update his CS and LS MRIs and get an EMG of his LLE. He never got any of those studies done, he says the [place] would never approve them. He comes in today with continued neck and low back with BLE pain in no particular distribution. He presents looking much older than his chronological age. He is in pain to minimal touch in multiple areas, which made it difficult to get an accurate exam on his strength. He had a fall earlier this year. His pain is unchanged. Pain is aching, burning, numbing, stabbing and throbbing. Pain is worse with manual/sedentary work, walking and affects sleep and recreational activities. Pain is better with nothing. Denies bladder/bowel dysfunction, saddle paresthesia, weakness, gait disturbance, or other neurological deficits. Denies chills, fever,night sweats, unexplained weight loss/weight gain, chest pain, sob or anxiety. Reports no new medical issues or hospitalizations since the last visit. Pt at this time desires to  continue with current care/proceed with medication evaluation/proceed with evaluation of neck and back pain. Significant Exam Findings: pain to minimal touch, difficult to examine    Medications: none    PMHx: Gastric Ulcer      ASSESSMENT   Diagnoses and all orders for this visit:    1. Spinal stenosis of lumbar region with neurogenic claudication  -     MRI LUMB SPINE W WO CONT; Future  -     EMG TWO EXTREMITIES LOWER; Future    2. Degenerative scoliosis in adult patient  -     MRI LUMB SPINE W WO CONT; Future    3. Cervical spinal stenosis  -     MRI CERV SPINE WO CONT; Future    4. Neuropathic pain  -     EMG TWO EXTREMITIES LOWER; Future    Other orders  -     gabapentin (NEURONTIN) 300 mg capsule; Take 1 Tab QHS x1 week, then BID x1 week, then TID         IMPRESSION AND PLAN:  This is a pt with neck and back pain who is doing is unchanged since last OV. 1) Pt was given information on MRI   2) CS and LS MRI  3) BLE EMG reports BLE pain  4) Re-start Gabapentin  4) Mr. Skylar Bartlett has a reminder for a \"due or due soon\" health maintenance. I have asked that he contact his primary care provider, No primary care provider on file. , for follow-up on this health maintenance. 5) We have informed patient to notify us for immediate appointment if he has any worsening neurogical symptoms or if an emergency situation presents, then call 911  6)  has been reviewed and is appropriate  7) Pt will follow-up in 6 wks w/ Estrella Son. Subjective  Pain Scale: 9/10    Pain Assessment  10/29/2018   Location of Pain Back;Leg   Pain Location Comment -   Location Modifiers Left;Right   Severity of Pain 8   Quality of Pain Aching; Sharp   Quality of Pain Comment stabbing, weakness   Duration of Pain -   Frequency of Pain Constant Aggravating Factors Walking;Standing   Limiting Behavior -   Relieving Factors (No Data)   Relieving Factors Comment norco helps when he has it   Result of Injury -         REVIEW OF SYSTEMS  Constitutional: Negative for fever, chills, or weight change. Respiratory: Negative for cough or shortness of breath. Cardiovascular: Negative for chest pain or palpitations. Gastrointestinal: Negative for incontinence, acid reflux, change in bowel habits, or constipation. Genitourinary: Negative for incontinence, dysuria and flank pain. Musculoskeletal: Positive for neck, back and BLE pain. See HPI. Skin: Negative for rash. Neurological:BLE neuropathic pain  radiculopathy. See HPI. Endo/Heme/Allergies: Negative. Psychiatric/Behavioral: Negative. PHYSICAL EXAMINATION  Visit Vitals  BP (!) 126/91   Pulse (!) 141   Temp 98.5 °F (36.9 °C)   Resp 22   Ht 5' 10\" (1.778 m)   Wt 221 lb 12.8 oz (100.6 kg)   SpO2 95%   BMI 31.82 kg/m²         Accompanied by self. Constitutional:  Well developed, well nourished, in no acute distress. Appears older than chronological age  Psychiatric: Affect and mood are appropriate. Integumentary: No rashes or abrasions noted on exposed areas. Cardiovascular/Peripheral Vascular: +2 radial & pedal pulses. No peripheral edema is noted. Lymphatic:  No evidence of lymphedema. No cervical lymphadenopathy. SPINE/MUSCULOSKELETAL EXAM    Cervical spine:  Neck is midline. Normal muscle tone. No focal atrophy is noted. Neck ROM decreased and pain with flexion, extension, turning right, turning left. Shoulder ROM intact. Tenderness to palpation bilateral trapezii. Negative Spurling's sign. Negative Tinel's sign. Negative Angeles's sign. Sensation grossly intact to light touch. Lumbar spine:  No rash, ecchymosis, or gross obliquity. No fasciculations. No focal atrophy is noted. Range of motion is pain with flexion, extension, turning right, turning left.  Tenderness to palpation diffuse low back pain with light to moderate touch. No tenderness to palpation at the sciatic notch. SI joints non-tender. Trochanters non tender. Straight leg raise negative    Sensation grossly intact to light touch. MOTOR:    Has 4/5 BUE and BLE throughout all muscle groups without resistance    DTRs are unable to perform, too painful    Ambulation with single point cane. FWB. Kyphotic posture, scoliosis evident        PAST MEDICAL HISTORY   Past Medical History:   Diagnosis Date    Back pain, chronic     Bilateral hip pain     Colon disorder     Spastic    Erectile dysfunction     Gastric ulcer     H/O emotional problems     Herniated disc     Hypertension     Knee pain     Low back pain     Vertigo        Past Surgical History:   Procedure Laterality Date    HX BACK SURGERY  06/03/1999    HX BACK SURGERY  05/22/2015    HX HEENT      Nasal    HX KNEE ARTHROSCOPY      Rt knee    HX ORTHOPAEDIC      HX ORTHOPAEDIC      Lt ankle    HX SHOULDER ARTHROSCOPY      Rt shoulder   . MEDICATIONS      Current Outpatient Medications   Medication Sig Dispense Refill    gabapentin (NEURONTIN) 300 mg capsule Take 1 Tab QHS x1 week, then BID x1 week, then TID 90 Cap 1    12 HOUR DECONGESTANT 120 mg CR tablet TAKE 1 TAB BY MOUTH ONCE A DAY. 6    CIALIS 5 mg tablet TAKE 1 TAB BY MOUTH ONCE A DAY. 0    buPROPion SR (WELLBUTRIN SR) 100 mg SR tablet Take 100 mg by mouth three (3) times daily.  clonazePAM (KLONOPIN) 1 mg tablet Take  by mouth three (3) times daily.  omeprazole (PRILOSEC) 40 mg capsule Take 40 mg by mouth two (2) times a day.  diphenhydrAMINE (BENADRYL) 25 mg capsule Take 25 mg by mouth every six (6) hours as needed.  OXYCODONE HCL (OXYCODONE PO) Take  by mouth.  HYDROcodone-acetaminophen 5-500 mg cap Take  by mouth.             ALLERGIES    Allergies   Allergen Reactions    Latex, Natural Rubber Other (comments)    Darvon [Propoxyphene] Nausea and Vomiting    Shellfish Containing Products Unknown (comments)          SOCIAL HISTORY    Social History     Socioeconomic History    Marital status:      Spouse name: Not on file    Number of children: Not on file    Years of education: Not on file    Highest education level: Not on file   Social Needs    Financial resource strain: Not on file    Food insecurity - worry: Not on file    Food insecurity - inability: Not on file   [employer] needs - medical: Not on file   [employer] needs - non-medical: Not on file   Occupational History    Not on file   Tobacco Use    Smoking status: Never Smoker    Smokeless tobacco: Never Used   Substance and Sexual Activity    Alcohol use: Yes     Comment: rare    Drug use: No    Sexual activity: Not on file   Other Topics Concern    Not on file   Social History Narrative    Not on file       FAMILY HISTORY  History reviewed. No pertinent family history.       Kris Werner NP

## 2018-10-30 ENCOUNTER — DOCUMENTATION ONLY (OUTPATIENT)
Dept: ORTHOPEDIC SURGERY | Age: 68
End: 2018-10-30

## 2018-10-30 NOTE — PROGRESS NOTES
Order and office notes faxed to [facility] to have them set up MRI C&L Spine and to notify patient of date. They will authorize with insurance if needed. Patient aware.

## 2018-10-31 ENCOUNTER — DOCUMENTATION ONLY (OUTPATIENT)
Dept: ORTHOPEDIC SURGERY | Age: 68
End: 2018-10-31

## 2018-10-31 NOTE — PROGRESS NOTES
Called patient to set the EMG that Nick Cavazos NP had ordered, and he stated that he had had this test before and they are very painful. He is getting ready for a trip and would rather wait until after he returns to get this done.

## 2019-04-26 ENCOUNTER — TELEPHONE (OUTPATIENT)
Dept: ORTHOPEDIC SURGERY | Age: 69
End: 2019-04-26

## 2019-04-26 DIAGNOSIS — M41.50 DEGENERATIVE SCOLIOSIS IN ADULT PATIENT: ICD-10-CM

## 2019-04-26 DIAGNOSIS — M79.2 NEUROPATHIC PAIN: ICD-10-CM

## 2019-04-26 DIAGNOSIS — M48.062 SPINAL STENOSIS OF LUMBAR REGION WITH NEUROGENIC CLAUDICATION: Primary | ICD-10-CM

## 2019-04-26 DIAGNOSIS — M48.02 CERVICAL SPINAL STENOSIS: ICD-10-CM

## 2019-04-26 NOTE — TELEPHONE ENCOUNTER
Pt needs a new order for MRI Lumbar and MRI Cerv Spine. He states he spoke with [facility] and the one in the system is too old. They are requesting a new one.

## 2019-04-26 NOTE — TELEPHONE ENCOUNTER
Daniel Light from [facility] / [address] [place] [address] in [place] called and is requesting a New order for the Cervical Spine and Lumbar from [address]. Daniel Light said the one they have is from 10/2018 so they need an Updated one. Daniel Light said the patient has an appointment with them on 05/03/2019. [facility] tel. [phone] option # 3  Fax # [phone].

## 2019-05-14 DIAGNOSIS — M41.50 DEGENERATIVE SCOLIOSIS IN ADULT PATIENT: ICD-10-CM

## 2019-05-14 DIAGNOSIS — M48.062 SPINAL STENOSIS OF LUMBAR REGION WITH NEUROGENIC CLAUDICATION: ICD-10-CM

## 2019-05-14 DIAGNOSIS — M48.02 CERVICAL SPINAL STENOSIS: ICD-10-CM

## 2019-05-21 ENCOUNTER — OFFICE VISIT (OUTPATIENT)
Dept: ORTHOPEDIC SURGERY | Age: 69
End: 2019-05-21

## 2019-05-21 VITALS
SYSTOLIC BLOOD PRESSURE: 126 MMHG | RESPIRATION RATE: 16 BRPM | TEMPERATURE: 97.8 F | DIASTOLIC BLOOD PRESSURE: 73 MMHG | HEART RATE: 91 BPM

## 2019-05-21 DIAGNOSIS — M25.552 HIP PAIN, LEFT: ICD-10-CM

## 2019-05-21 DIAGNOSIS — M54.50 LUMBAR PAIN: ICD-10-CM

## 2019-05-21 DIAGNOSIS — M41.50 DEGENERATIVE SCOLIOSIS: Primary | ICD-10-CM

## 2019-05-21 DIAGNOSIS — Z71.1 CONCERN ABOUT HEART ATTACK WITHOUT DIAGNOSIS: ICD-10-CM

## 2019-05-21 NOTE — PATIENT INSTRUCTIONS
Hip Arthritis: Care Instructions  Your Care Instructions    Arthritis, also called osteoarthritis, is a breakdown of the tissue (cartilage) that cushions your joints. Many people have some arthritis as they age. When the cartilage in your hip joints wears down, your hip bone rubs against the hip socket. This causes pain and stiffness. Work with your doctor to find the right mix of treatments for your arthritis. There are things you can do at home to protect your hip joints, ease your pain, and help you stay active. But if your arthritis becomes so bad that you cannot walk, you may need surgery to replace the hip joint. Follow-up care is a key part of your treatment and safety. Be sure to make and go to all appointments, and call your doctor if you are having problems. It's also a good idea to know your test results and keep a list of the medicines you take. How can you care for yourself at home? · Stay at a healthy weight. Being overweight puts extra strain on your hip joints. · Talk to your doctor or physical therapist about exercises that will help ease hip pain. These tips may help. ? Stretch to help prevent stiffness and to prevent injury before you exercise. You may enjoy gentle forms of yoga to help keep your joints and muscles flexible. ? Walk instead of jog. Other types of exercise that are less stressful on the joints include riding a bike, swimming, and doing water exercise. ? Lift weights. Strong muscles help reduce stress on your joints. Stronger thigh muscles, for example, take some of the stress off of the knees and hips. Learn the right way to lift weights so you do not make joint pain worse. · Take pain medicines exactly as directed. ? If the doctor gave you a prescription medicine for pain, take it as prescribed. ? If you are not taking a prescription pain medicine, ask your doctor if you can take an over-the-counter medicine.   · Use a cane, crutch, walker, or another device if you need help to get around. These can help rest your hips. You also can use other things to make life easier, such as a higher toilet seat. · Do not sit in low chairs, which can make it painful to get up. · Put heat or cold on your sore hips as needed. Use whichever helps you most. You also can go back and forth between hot and cold packs. ? Apply heat 2 or 3 times a day for 20 to 30 minutes--using a heating pad, hot shower, or hot pack--to relieve pain and stiffness. ? Put ice or a cold pack on your sore hips for 10 to 20 minutes at a time to numb the area. Put a thin cloth between the ice and your skin. · Think about talking to your doctor about using capsaicin, a cream you apply to the skin for pain relief. When should you call for help? Call your doctor now or seek immediate medical care if:    · You have sudden swelling, warmth, or pain in any joint.     · You have joint pain and a fever or rash.     · You have such bad pain that you cannot use the joint.    Watch closely for changes in your health, and be sure to contact your doctor if:    · You have mild joint symptoms that continue even with more than 6 weeks of care at home.     · You do not get better as expected.     · You have stomach pain or other problems with your medicine. Where can you learn more? Go to http://nghia-carolyn.info/. Enter C489 in the search box to learn more about \"Hip Arthritis: Care Instructions. \"  Current as of: [place] 10, 2018  Content Version: 11.9  © 3492-9014 [employer]. Care instructions adapted under license by [employer] (which disclaims liability or warranty for this information). If you have questions about a medical condition or this instruction, always ask your healthcare professional. [address] any warranty or liability for your use of this information.

## 2019-05-21 NOTE — PROGRESS NOTES
[address]  [address], [address]  [place]  Phone: [phone]  Fax: [phone]  PROGRESS NOTE  Patient: Norman Payton                MRN: [id number]       SSN: [id number]  YOB: 1950        AGE: 77 y.o. SEX: male  There is no height or weight on file to calculate BMI. PCP: No primary care provider on file.  05/21/19    Chief Complaint   Patient presents with    Back Pain     MRI fu    Leg Pain       HISTORY OF PRESENT ILLNESS, RADIOGRAPHS, and PLAN:     HISTORY OF PRESENT ILLNESS:  Mr. Aba Cavazos returns today. It has been some time since I have seen him. Evidently, he has been to [facility] since I saw him last.  We had a long discussion about his relationship with his [facility] fiancé. He says while in [facility], he was diagnosed with cardiac issues, which he is trying to have reevaluated at the [place]. He has told me that he could have a heart attack at any time. He has two different complaints. One is his spinal alignment. He is out of coronal and sagittal balance. Again, he has had a previous L4-5 fusion. He is off laterally to the right with a junctional kyphosis and a lateral tilt to the right with a bridging osteophyte on the right at L3-4 and degenerative collapse at L2-3 and L1-2. Repeat MRI done of his lumbar spine demonstrates some foraminal stenosis. There is no particular central stenosis. He has diffuse, nonspecific back pain. His main concern is his spinal alignment and its cosmesis. His functional issue is mechanical pain in his left buttock and thigh consistent with hip arthritis. He has a severely degenerative left hip. He has decreased range of motion. He has had a progression of degenerative change in his left hip on x-ray.      ASSESSMENT/PLAN: I have explained to him that in my opinion, he likely requires a left total hip replacement and that the prime complaints of mechanical pain dysfunction and use of a cane he has comes from this mechanical hip pain that he is describing. 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 Different techniques would be utilized. One would hope to be able to resect the tethered lateral osteophyte at L3-4. It would be a lot of surgery to try to restore his coronal and sagittal balance. I am uncertain if it would do anything for his pain, and uncertain if he could tolerate it medically. It is not something I would particularly recommend, and I have offered him a referral to the [facility]. He would clearly like me to address his problems for him. I have offered him a referral to Cardiology. He is insistent on having the [facility] address his cardiology issues. At this point, I am going to refer him to Dr. Himanshu Cardona to address his most direct and pointed and treatable problem, which I think is his left hip arthritis. I have explained to him that even that cannot be addressed dalc0iz his cardiac questions are evaluated and addressed. I am not certain if he truly has a cardiac problem. Again, it was some sort of hot evaluation in [facility] that led him to believe he has a cardiac condition. I will see him back after his cardiology evaluation and evaluation by Dr. Himanshu Cardona. Past Medical History:   Diagnosis Date    Back pain, chronic     Bilateral hip pain     Colon disorder     Spastic    Erectile dysfunction     Gastric ulcer     H/O emotional problems     Herniated disc     Hypertension     Knee pain     Low back pain     Vertigo        History reviewed. No pertinent family history.     Current Outpatient Medications   Medication Sig Dispense Refill    gabapentin (NEURONTIN) 300 mg capsule Take 1 Tab QHS x1 week, then BID x1 week, then TID 90 Cap 1    12 HOUR DECONGESTANT 120 mg CR tablet TAKE 1 TAB BY MOUTH ONCE A DAY. 6    CIALIS 5 mg tablet TAKE 1 TAB BY MOUTH ONCE A DAY. 0    buPROPion SR (WELLBUTRIN SR) 100 mg SR tablet Take 100 mg by mouth three (3) times daily.  clonazePAM (KLONOPIN) 1 mg tablet Take  by mouth three (3) times daily.  omeprazole (PRILOSEC) 40 mg capsule Take 40 mg by mouth two (2) times a day.  diphenhydrAMINE (BENADRYL) 25 mg capsule Take 25 mg by mouth every six (6) hours as needed.  OXYCODONE HCL (OXYCODONE PO) Take  by mouth.  HYDROcodone-acetaminophen 5-500 mg cap Take  by mouth.            Allergies   Allergen Reactions    Latex, Natural Rubber Other (comments)    Darvon [Propoxyphene] Nausea and Vomiting    Shellfish Containing Products Unknown (comments)       Past Surgical History:   Procedure Laterality Date    HX BACK SURGERY  06/03/1999    HX BACK SURGERY  05/22/2015    HX HEENT      Nasal    HX KNEE ARTHROSCOPY      Rt knee    HX ORTHOPAEDIC      HX ORTHOPAEDIC      Lt ankle    HX SHOULDER ARTHROSCOPY      Rt shoulder       Past Medical History:   Diagnosis Date    Back pain, chronic     Bilateral hip pain     Colon disorder     Spastic    Erectile dysfunction     Gastric ulcer     H/O emotional problems     Herniated disc     Hypertension     Knee pain     Low back pain     Vertigo        Social History     Socioeconomic History    Marital status:      Spouse name: Not on file    Number of children: Not on file    Years of education: Not on file    Highest education level: Not on file   Occupational History    Not on file   Social Needs    Financial resource strain: Not on file    Food insecurity:     Worry: Not on file     Inability: Not on file    Transportation needs:     Medical: Not on file     Non-medical: Not on file   Tobacco Use    Smoking status: Never Smoker    Smokeless tobacco: Never Used   Substance and Sexual Activity    Alcohol use: Yes     Comment: rare    Drug use: No    Sexual activity: Not on file   Lifestyle    Physical activity:     Days per week: Not on file     Minutes per session: Not on file    Stress: Not on file   Relationships    Social connections:     Talks on phone: Not on file     Gets together: Not on file     Attends Nondenominational service: Not on file     Active member of club or organization: Not on file     Attends meetings of clubs or organizations: Not on file     Relationship status: Not on file    Intimate partner violence:     Fear of current or ex partner: Not on file     Emotionally abused: Not on file     Physically abused: Not on file     Forced sexual activity: Not on file   Other Topics Concern    Not on file   Social History Narrative    Not on file         REVIEW OF SYSTEMS:   CONSTITUTIONAL SYMPTOMS:  Negative. EYES:  Negative. EARS, NOSE, THROAT AND MOUTH:  Negative. CARDIOVASCULAR:  Negative. RESPIRATORY:  Negative. GENITOURINARY: Per HPI. GASTROINTESTINAL:  Per HPI. INTEGUMENTARY (SKIN AND/OR BREAST):  Negative. MUSCULOSKELETAL: Per HPI.   ENDOCRINE/RHEUMATOLOGIC:  Negative. NEUROLOGICAL:  Per HPI. HEMATOLOGIC/LYMPHATIC:  Negative. ALLERGIC/IMMUNOLOGIC:  Negative. PSYCHIATRIC:  Negative. PHYSICAL EXAMINATION:   Visit Vitals  /73 (BP 1 Location: Left arm, BP Patient Position: Sitting)   Pulse 91   Temp 97.8 °F (36.6 °C) (Oral)   Resp 16    PAIN SCALE: 10 - Worst pain ever/10    CONSTITUTIONAL: The patient is in no apparent distress and is alert and oriented x 3. HEENT: Normocephalic. Hearing grossly intact. NECK: Supple and symmetric. no tenderness, or masses were felt. RESPIRATORY: No labored breathing. CARDIOVASCULAR: The carotid pulses were normal. Peripheral pulses were 2+. CHEST: Normal AP diameter and normal contour without any kyphoscoliosis. LYMPHATIC: No lymphadenopathy was appreciated in the neck, axillae or groin.   SKIN:  Negative for scars, rashes, lesions, or ulcers on the right upper, right lower, left upper, left lower and trunk. NEUROLOGICAL: Alert and oriented x 3. Ambulation with single point cane. FWB. EXTREMITIES: See musculoskeletal.  MUSCULOSKELETAL:   Head and Neck: Stiffness. Negative for misalignment, asymmetry, crepitation, defects, tenderness masses or effusions.  Left Upper Extremity: Inspection, percussion and palpation performed. Angeless sign is negative.  Right Upper Extremity: Inspection, percussion and palpation performed. Angeless sign is negative.  Spine, Ribs and Pelvis: L hip pain. Inspection, percussion and palpation performed. Negative for misalignment, asymmetry, crepitation, defects, tenderness masses or effusions.  Left Lower Extremity: Numbness. Inspection, percussion and palpation performed. Negative straight leg raise.  Right Lower Extremity: Inspection, percussion and palpation performed. Negative straight leg raise. SPINE EXAM:     Cervical spine: Neck is midline. Normal muscle tone. No focal atrophy is noted. Lumbar spine: No rash, ecchymosis, or gross obliquity. No focal atrophy is noted. ASSESSMENT    ICD-10-CM ICD-9-CM    1. Degenerative scoliosis M41.9 737.30    2. Hip pain, left M25.552 719.45 AMB POC X-RAY RADEX HIP UNI WITH PELVIS 2-3 VIEWS      REFERRAL TO ORTHOPEDICS   3. Lumbar pain M54.5 724.2 AMB POC XRAY, SPINE, LUMBOSACRAL; 2 O   4. Concern about heart attack without diagnosis Z71.1 V65.5 REFERRAL TO CARDIOLOGY       Written by Magali Short, as dictated by Blanca Diaz MD.    I, Dr. Blanca Diaz MD, confirm that all documentation is accurate.

## 2019-06-19 ENCOUNTER — OFFICE VISIT (OUTPATIENT)
Dept: ORTHOPEDIC SURGERY | Facility: CLINIC | Age: 69
End: 2019-06-19

## 2019-06-19 VITALS
OXYGEN SATURATION: 95 % | DIASTOLIC BLOOD PRESSURE: 64 MMHG | RESPIRATION RATE: 16 BRPM | HEART RATE: 93 BPM | WEIGHT: 223 LBS | SYSTOLIC BLOOD PRESSURE: 103 MMHG | HEIGHT: 70 IN | TEMPERATURE: 97.5 F | BODY MASS INDEX: 31.92 KG/M2

## 2019-06-19 DIAGNOSIS — M54.50 LUMBAR PAIN: ICD-10-CM

## 2019-06-19 DIAGNOSIS — M70.62 TROCHANTERIC BURSITIS, LEFT HIP: ICD-10-CM

## 2019-06-19 DIAGNOSIS — M25.552 LEFT HIP PAIN: ICD-10-CM

## 2019-06-19 DIAGNOSIS — M16.12 PRIMARY OSTEOARTHRITIS OF LEFT HIP: Primary | ICD-10-CM

## 2019-06-19 RX ORDER — TRAMADOL HYDROCHLORIDE 50 MG/1
50 TABLET ORAL
Qty: 21 TAB | Refills: 0 | Status: CANCELLED | OUTPATIENT
Start: 2019-06-19 | End: 2019-06-26

## 2019-06-19 RX ORDER — TRAMADOL HYDROCHLORIDE 50 MG/1
50 TABLET ORAL
COMMUNITY
End: 2021-03-05

## 2019-06-19 RX ORDER — BETAMETHASONE SODIUM PHOSPHATE AND BETAMETHASONE ACETATE 3; 3 MG/ML; MG/ML
6 INJECTION, SUSPENSION INTRA-ARTICULAR; INTRALESIONAL; INTRAMUSCULAR; SOFT TISSUE ONCE
Qty: 1 ML | Refills: 0
Start: 2019-06-19 | End: 2019-06-19

## 2019-06-19 NOTE — PATIENT INSTRUCTIONS
You have been provided with an order for durable medical equipment that you may  at an outside facility as our office does not carry the equipment you need. You may pick it up at any medical supply company you like. Listed below are a few different locations for your convenience:    [address], [address]  Phone: [phone]    [address]   [place], [address] [place] [address]  Phone: [phone]    [facility]  [facility] [address] [place] [address]  Phone: [phone]

## 2019-06-19 NOTE — PROGRESS NOTES
Patient: Jodie Amaya                MRN: [id number]       SSN: [id number]  YOB: 1950        AGE: 77 y.o. SEX: male  Body mass index is 32 kg/m². PCP: No primary care provider on file.  06/19/19    HISTORY:  I had the pleasure of reviewing [employer] today. Cortez Ronquillo walks very poorly. He lists to one side and has an antalgic gait pattern and he has also got neck problems and apparently some ear issues and it bothers him as well. He also complains of left hip pain, hurts him in the groin, hurts him laterally. When asking where he hurts the most, he points to the low back and the hip is #2. PHYSICAL EXAMINATION:  On examination today, he is very pleasant. He tends to list to the right. Both hips have a little bit of stiffness in them; perhaps the left one is a little bit worse with internal rotation. It does reproduce some groin discomfort, but not severely so. Palpation of his low back is more tender than his hip is today. He is also tender over the greater trochanter. He has significant neuropathy distally, a little bit worse proximally on the right, and worse distally on the left. No footdrop and EHL is 5-/5. Tibialis anterior seems to be 5/5. Straight leg raise is equivocal with some radiculopathy extending to the level of the knee. I repeated the left hip exam and within the range of motion he has some tenderness, but not severely so. At the end of internal rotation about 15 degrees, it does reproduce a little bit more groin pain. RADIOGRAPHS:  Review of his x-rays, he has moderately advanced to advanced arthritis involving the left hip. I would not call it endstaged. PROCEDURE:  Under aseptic conditions and after informed written consent with time out, left trochanteric bursa injection with 1 mL Celestone preparation, i.e. 6 mg.       IMPRESSION:  I would like to see how much pain is relieved with a bursal injection as he has bursitis of the hip and also an intraarticular injection. If it takes enough pain away that he is happy with it, I would be happy to offer him a hip replacement. My concern is that his back hurts him the most and hip replacement is not going to help with his back. Depending on the results of the injection clinically, I would be happy to replace the hip when he wants, but I suspect that he is going to probably want back surgery first or wherever that might be. Certainly I would be happy to replace the hip if the cortisone injection relief that he would like to have sustained. It has been a pleasure to share in his care. CC:  Family Medicine         REVIEW OF SYSTEMS:      CON: negative for weight loss, fever  EYE: negative for double vision  ENT: negative for hoarseness  RS:   negative for Tb  GI:    negative for blood in stool  :  negative for blood in urine  Other systems reviewed and noted below. Past Medical History:   Diagnosis Date    Back pain, chronic     Bilateral hip pain     Colon disorder     Spastic    Erectile dysfunction     Gastric ulcer     H/O emotional problems     Herniated disc     Hypertension     Knee pain     Low back pain     Vertigo        History reviewed. No pertinent family history. Current Outpatient Medications   Medication Sig Dispense Refill    traMADol (ULTRAM) 50 mg tablet Take 50 mg by mouth every six (6) hours as needed for Pain.  gabapentin (NEURONTIN) 300 mg capsule Take 1 Tab QHS x1 week, then BID x1 week, then TID 90 Cap 1    12 HOUR DECONGESTANT 120 mg CR tablet TAKE 1 TAB BY MOUTH ONCE A DAY. 6    CIALIS 5 mg tablet TAKE 1 TAB BY MOUTH ONCE A DAY. 0    buPROPion SR (WELLBUTRIN SR) 100 mg SR tablet Take 100 mg by mouth three (3) times daily.  clonazePAM (KLONOPIN) 1 mg tablet Take  by mouth three (3) times daily.  omeprazole (PRILOSEC) 40 mg capsule Take 40 mg by mouth two (2) times a day.         diphenhydrAMINE (BENADRYL) 25 mg capsule Take 25 mg by mouth every six (6) hours as needed.  OXYCODONE HCL (OXYCODONE PO) Take  by mouth.  HYDROcodone-acetaminophen 5-500 mg cap Take  by mouth.            Allergies   Allergen Reactions    Latex, Natural Rubber Other (comments)    Darvon [Propoxyphene] Nausea and Vomiting    Shellfish Containing Products Unknown (comments)       Past Surgical History:   Procedure Laterality Date    HX BACK SURGERY  06/03/1999    HX BACK SURGERY  05/22/2015    HX HEENT      Nasal    HX KNEE ARTHROSCOPY      Rt knee    HX ORTHOPAEDIC      HX ORTHOPAEDIC      Lt ankle    HX SHOULDER ARTHROSCOPY      Rt shoulder       Social History     Socioeconomic History    Marital status:      Spouse name: Not on file    Number of children: Not on file    Years of education: Not on file    Highest education level: Not on file   Occupational History    Not on file   Social Needs    Financial resource strain: Not on file    Food insecurity:     Worry: Not on file     Inability: Not on file    Transportation needs:     Medical: Not on file     Non-medical: Not on file   Tobacco Use    Smoking status: Never Smoker    Smokeless tobacco: Never Used   Substance and Sexual Activity    Alcohol use: Yes     Comment: rare    Drug use: No    Sexual activity: Not on file   Lifestyle    Physical activity:     Days per week: Not on file     Minutes per session: Not on file    Stress: Not on file   Relationships    Social connections:     Talks on phone: Not on file     Gets together: Not on file     Attends Sabianism service: Not on file     Active member of club or organization: Not on file     Attends meetings of clubs or organizations: Not on file     Relationship status: Not on file    Intimate partner violence:     Fear of current or ex partner: Not on file     Emotionally abused: Not on file     Physically abused: Not on file     Forced sexual activity: Not on file   Other Topics Concern    Not on file Social History Narrative    Not on file       Visit Vitals  /64   Pulse 93   Temp 97.5 °F (36.4 °C) (Oral)   Resp 16   Ht 5' 10\" (1.778 m)   Wt 223 lb (101.2 kg)   SpO2 95%   BMI 32.00 kg/m²         PHYSICAL EXAMINATION:  GENERAL: Alert and oriented x3, in no acute distress, well-developed, well-nourished, afebrile. HEART: No JVD. EYES: No scleral icterus   NECK: No significant lymphadenopathy   LUNGS: No respiratory compromise or indrawing  ABDOMEN: Soft, non-tender, non-distended. Electronically signed by:  Anthony Faustin MD

## 2019-09-24 ENCOUNTER — OFFICE VISIT (OUTPATIENT)
Dept: CARDIOLOGY CLINIC | Age: 69
End: 2019-09-24

## 2019-09-24 ENCOUNTER — TELEPHONE (OUTPATIENT)
Dept: CARDIOLOGY CLINIC | Age: 69
End: 2019-09-24

## 2019-09-24 VITALS
WEIGHT: 225 LBS | DIASTOLIC BLOOD PRESSURE: 76 MMHG | HEART RATE: 54 BPM | SYSTOLIC BLOOD PRESSURE: 121 MMHG | BODY MASS INDEX: 32.21 KG/M2 | OXYGEN SATURATION: 96 % | HEIGHT: 70 IN

## 2019-09-24 DIAGNOSIS — I48.92 ATRIAL FLUTTER, UNSPECIFIED TYPE (HCC): Primary | ICD-10-CM

## 2019-09-24 RX ORDER — BUPROPION HYDROCHLORIDE 300 MG/1
300 TABLET ORAL
COMMUNITY

## 2019-09-24 RX ORDER — PSEUDOEPHEDRINE HYDROCHLORIDE 60 MG/1
TABLET ORAL
COMMUNITY

## 2019-09-24 RX ORDER — TRAZODONE HYDROCHLORIDE 100 MG/1
100 TABLET ORAL
COMMUNITY

## 2019-09-24 RX ORDER — DILTIAZEM HYDROCHLORIDE 120 MG/1
120 CAPSULE, EXTENDED RELEASE ORAL DAILY
COMMUNITY

## 2019-09-24 NOTE — PROGRESS NOTES
[facility]    Mr. Edgard Stone is a 73-year male with a history of atrial flutter, scoliosis, chronic back pain    Patient is here today to establish care with me. Patient is a established patient of [facility]. According to patient, he was diagnosed with atrial flutter and he was seen by cardiology team and was referred out for ablation treatment. Patient was started on Cardizem and apixaban. Patient thinks he is taking apixaban but is not sure about Cardizem. Once or twice a week he feels like he has some fluttering in the chest which she describes as dull ache sensation. Sometimes last for hour or sometimes couple hours. He denies any pressure or heaviness. He is functionally limited because of his chronic back pain and scoliosis. He denies presyncope or syncope. He denies any bleeding problem on medication. He denies PND or lower extremity swelling  Denies any nausea, vomiting, abdominal pain, fever, chills, sputum production. No hematuria or other bleeding complaints    Past Medical History:   Diagnosis Date    Atrial flutter ([address]     Colon disorder     Spastic    Erectile dysfunction     Gastric ulcer     Herniated disc     Hypertension     Low back pain     Scoliosis     Vertigo          Past Surgical History:   Procedure Laterality Date    HX BACK SURGERY  06/03/1999    HX BACK SURGERY  05/22/2015    HX HEENT      Nasal    HX KNEE ARTHROSCOPY      Rt knee    HX ORTHOPAEDIC      HX ORTHOPAEDIC      Lt ankle    HX SHOULDER ARTHROSCOPY      Rt shoulder       Current Outpatient Medications   Medication Sig    apixaban (ELIQUIS) 5 mg tablet Take 5 mg by mouth two (2) times a day.  dilTIAZem (TIAZAC) 120 mg SR capsule Take 120 mg by mouth daily.  traMADol (ULTRAM) 50 mg tablet Take 50 mg by mouth every six (6) hours as needed for Pain.     clonazePAM (KLONOPIN) 1 mg tablet Take  by mouth three (3) times daily.  Omeprazole delayed release (PRILOSEC D/R) 20 mg tablet Take 40 mg by mouth two (2) times a day.  diphenhydrAMINE (BENADRYL) 25 mg capsule Take 25 mg by mouth every six (6) hours as needed.  gabapentin (NEURONTIN) 300 mg capsule Take 1 Tab QHS x1 week, then BID x1 week, then TID    12 HOUR DECONGESTANT 120 mg CR tablet TAKE 1 TAB BY MOUTH ONCE A DAY.  CIALIS 5 mg tablet TAKE 1 TAB BY MOUTH ONCE A DAY. No current facility-administered medications for this visit. Allergies and Sensitivities:  Allergies   Allergen Reactions    Latex, Natural Rubber Other (comments)    Darvon [Propoxyphene] Nausea and Vomiting    Shellfish Containing Products Unknown (comments)       Family History:  No family history on file. Social History:  Social History     Tobacco Use    Smoking status: Never Smoker    Smokeless tobacco: Never Used   Substance Use Topics    Alcohol use: Yes     Comment: rare    Drug use: No     He  reports that he has never smoked. He has never used smokeless tobacco.  He  reports that he drinks alcohol. Review of Systems:  Cardiac symptoms as noted above in HPI. All others negative. Denies fatigue, malaise, skin rash, joint pain, blurring vision, photophobia, neck pain, hemoptysis, chronic cough, nausea, vomiting, hematuria, burning micturition, BRBPR, chronic headaches. Physical Exam:  BP Readings from Last 3 Encounters:   09/24/19 121/76   06/19/19 103/64   05/21/19 126/73         Pulse Readings from Last 3 Encounters:   09/24/19 (!) 54   06/19/19 93   05/21/19 91          Wt Readings from Last 3 Encounters:   09/24/19 225 lb (102.1 kg)   06/19/19 223 lb (101.2 kg)   10/29/18 221 lb 12.8 oz (100.6 kg)       Constitutional: Oriented to person, place, and time. HENT: Head: Normocephalic and atraumatic. Eyes: Conjunctivae and extraocular motions are normal.   Neck: No JVD present. Carotid bruit is not appreciated. Cardiovascular: Regular rhythm.    No murmur, gallop or rubs appreciated  Lung: Breath sounds normal. No respiratory distress. No ronchi or rales appreciated  Abdominal: No tenderness. No rebound and no guarding. Musculoskeletal: There is no lower extremity edema. No cynosis  Lymphadenopathy:  No cervical or supraclavicular adenopathy appriciated. Neurological: No gross motor deficit noted. Skin: No visible skin rash noted. No Ear discharge noted  Psychiatric: Normal mood and affect. Good distal pulse    Review of Data  LABS:   Lab Results   Component Value Date/Time    Sodium 142 12/10/2012 08:10 PM    Potassium 3.5 12/10/2012 08:10 PM    Chloride 103 12/10/2012 08:10 PM    CO2 33 (H) 12/10/2012 08:10 PM    Glucose 70 (L) 12/10/2012 08:10 PM    BUN 8 12/10/2012 08:10 PM    Creatinine 0.90 12/10/2012 08:10 PM     No flowsheet data found. No results found for: GPT, ALT  No results found for: HBA1C, HGBE8, UTP1PMTX, VVM4WUYV, ISQ2GSNT    EKG  Probable typical atrial flutter with variable block. Heart rate 100 bpm.  Nonspecific ST-T changes    ECHO    STRESS TEST (EST, PHARM, NUC, ECHO etc)    CATHETERIZATION    IMPRESSION & PLAN:  Mr. Balbina Quan is a 60-year-old male with atrial flutter, chronic back pain, scoliosis    Atrial flutter:  Patient was diagnosed with atrial flutter likely in March while he was in [facility]. In June 2019, he was admitted to [facility] and was diagnosed with atrial flutter and was told that he will need ablation. He did have echocardiogram at the [facility] system at the time. I am requesting echo report from the [place] system  Patient was referred out for ablation for atrial flutter. Initially he was supposed to go other facility however he is here today  He complains of occasional fluttering sensation without presyncope or syncope  According to [place] record, he is supposed to be on apixaban and Cardizem.   He is not sure if he is taking Cardizem or not but he is sure that he is taking apixaban without any bleeding problem  EKG today in my opinion suggest probable typical atrial flutter. I discussed with patient management for atrial flutter including medical management with rate control and anticoagulation versus invasive ablation. Risk benefits alternatives and complication discussed with the patient in detail. Patient would like to proceed with ablation. I will schedule this procedure with my partner Dr. Sherley Medina. I have advised patient to go home and let me know if he is taking Cardizem or not    Currently patient denies any symptoms to suggest angina or heart failure. There is no evidence of fluid overload on exam.  Rest of the medical problem will be addressed by [facility]    This plan was discussed with patient who is in agreement. Thank you for allowing me to participate in patient care. Please feel free to call me if you have any question or concern. Iván Garcia MD  Please note: This document has been produced using voice recognition software. Unrecognized errors in transcription may be present.

## 2019-09-24 NOTE — PROGRESS NOTES
1. Have you been to the ER, urgent care clinic since your last visit? Hospitalized since your last visit? Yes    2. Have you seen or consulted any other health care providers outside of the [address] [place] [address] since your last visit? Include any pap smears or colon screening. No

## 2019-09-25 ENCOUNTER — DOCUMENTATION ONLY (OUTPATIENT)
Dept: CARDIOLOGY CLINIC | Age: 69
End: 2019-09-25

## 2019-09-25 NOTE — PROGRESS NOTES
Asked to review by Dr. Treva Mcbride    Diagnosis:  -Suspected typical atrial flutter, referred by [facility] for possible ablation. Symptoms of fluttering 1-2/week  -Chronic diltiazem and Eliquis use  -Echo at [facility], awaiting results  -Chronic back pain with limited functional status  -h/o HTN  -Scoliosis  -h/o vertigo  -Remote gastric ulcer on PPI  -Shellfish allergy    Primary cardiologist at [place] and recently Dr. Zac Bolanos:  Based on EKG, will schedule for MARKUS/A.flutter ablation.

## 2019-09-30 ENCOUNTER — TELEPHONE (OUTPATIENT)
Dept: CARDIOLOGY CLINIC | Age: 69
End: 2019-09-30

## 2019-09-30 DIAGNOSIS — I48.92 ATRIAL FLUTTER, UNSPECIFIED TYPE (HCC): Primary | ICD-10-CM

## 2019-09-30 NOTE — TELEPHONE ENCOUNTER
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 Grant Watt. Kika Number could you please call him with his instructions. Kristen Almodovar. [facility]  Male, 77 y.o., 1950  Weight:   102.1 kg (225 lb)  MRN:   [id number]  Phone:   [phone] (H) . .. PCP:   None  Primary Cvg:   [facility]  Last Appt With Me  Next Appt With Me  None  Next Appt  3/26/20 - Francisco Monroe MD - Donna Lemon [address]   Patient Call     Ann Alejo MD  You; Francisco Monroe MD 5 days ago      Juliano Archer, please schedule patient for MARKUS/flutter ablation at [facility] at patient convenience. [facility] Eliquis the morning of procedure.  [facility], saw Dr. Heather Hawkins as outpatient. Thx    Routing comment       Les Moreno MD 5 days ago         Asked to review by Dr. Heather Hawkins     Diagnosis:  -Suspected typical atrial flutter, referred by [facility] for possible ablation. Symptoms of fluttering 1-2/week  -Chronic diltiazem and Eliquis use  -Echo at [facility], awaiting results  -Chronic back pain with limited functional status  -h/o HTN  -Scoliosis  -h/o vertigo  -Remote gastric ulcer on PPI  -Shellfish allergy     Primary cardiologist at [place] and recently Dr. Zoraida Moralez:  Based on EKG, will schedule for MARKUS/A.flutter ablation.          Documentation

## 2019-10-01 NOTE — TELEPHONE ENCOUNTER
[facility]          Patient  EP Instructions                  1. You are scheduled to have a MARKUS/AFlutter Ablation on  October 8, 2019 , at 1030 am.    Please check in at 0930 am.    2. Please go to [facility] and gustavo in the outpatient parking lot that is located around to the back of the hospital and enter through the [facility]. Once you enter through the [facility] check in with the  there. The  will either give you directions or assist you in getting to the cath holding area. 3.  You are not to eat or drink anything after midnight the night before your procedure. 4. Please continue to take your medications with a small sip of water on the morning of the procedure with the following exceptions:  Hold eliquis the morning of your procedure. 5. If you are diabetic, do not take your insulin/sugar pill the morning of the procedure. 6. We encourage families to wait in the waiting room on the first floor while the procedure is being done. The Doctor will come out and talk with you as soon as the procedure is over. 7. There is the possibility that you may spend the night in the hospital, depending on the results of the procedure. This will be determined after the procedure is done. 8.   If you or your family have any questions, please call our office Monday-Friday 9:00am         -4:30 pm , at [phone], and ask to speak to one of the nurses.

## 2019-10-04 ENCOUNTER — TELEPHONE (OUTPATIENT)
Dept: CARDIOLOGY CLINIC | Age: 69
End: 2019-10-04

## 2019-10-04 NOTE — TELEPHONE ENCOUNTER
Contact[facility] dept[facility] Spoke with Gutierrez Carrasco. Two patient Identifiers confirmed. Advised I needed to confirm that an auth was not need for [facility] and ablation for Dr Isra Pitts at [place]. Per Estella Pitts is under the umbrella with Dr Jean-Claude Pavon no Laura Quinton needed. Procedures were authorized under referral. Call reference # [id number]. No other issues noted will send email to [facility] ( for Dr Isra Pitts).

## 2019-10-04 NOTE — TELEPHONE ENCOUNTER
Contacted Luis spoke with Two patient Identifiers confirmed. Advised per notes below and referenced call # [id number]. Per John Pack is in network for [employer]. Facility in network and both providers are under same facility   Tax ID for [facility] is covered. No other issues noted.

## 2019-10-07 NOTE — H&P
[facility] - Consult Note    Consultation request by Eric Perez MD for advice/opinion related to evaluating Atrial flutter [facility]) [I48.92]    Date of  Admission: No admission date for patient encounter. Primary Care Physician:  No primary care provider on file. Assessment:     -Suspected typical atrial flutter, referred by [facility] for possible ablation. Symptoms of fluttering 1-2/week  -Chronic diltiazem and Eliquis use. Initially saw Dr. Lidya High.  -Echo at [facility], awaiting results  -Chronic back pain with limited functional status  -h/o HTN  -Scoliosis  -h/o vertigo  -Remote gastric ulcer on PPI  -Shellfish allergy     Primary cardiologist at [place] and recently Dr. Escobar Light:     Plan MARKUS with possible atrial flutter ablation. Discussed treatment options with patient. All questions answered. Last dose of Eliquis this morning. Pertinent risks, benefits, alternatives discussed. Risks include emergent intubation as well as possibly inability to intubate. There can be exacerbation of lung and heart disease including but not limited to heart failure and COPD/asthma. Risks include but are not limited to acute and chronic pain, infection, bleeding, deep venous thrombosis with chronic swelling of extremity, pulmonary embolism, anesthesia reactions, intubation,  pneumothorax, hemothorax, perforation of the heart muscle/chambers, emergent open heart surgery with bypass/valve replacement, pacemaker, AICD, incessant VT, valve damage, nerve damage, and death. Possible damage to the throat and structures around the throat and esophagus and stomach. There can be a prolonged hospitalization with brain damage and reduced or compromised long term mobility. By stating these are possible risks, this does not exclude the potential for additional risks not named here. History of Present Illness:      This is a 77 y.o. male admitted for Atrial flutter ([address] [I48.92]. Patient complains of:    Referred by Dr. Felix Ivey for possible EP study with atrial flutter ablation. Referred to him by [facility]. Diagnosed with A.flutter with occasional dyspnea. Symptoms of fluttering, no syncope. Review of Symptoms:  Except as stated above include:  Constitutional:  Negative  Ears, nose, throat:  Negative  Respiratory:  negative  Cardiovascular:  fluttering  Gastrointestinal: negative  Genitourinary:  negative  Musculoskeletal:  Negative  Neurological:  Negative  Dermatological:  Negative  Hematological: Negative  Endocrinological: Negative  Allergy: Negative  Psychological:  Negative       Past Medical History:     Past Medical History:   Diagnosis Date    Atrial flutter ([address]     Colon disorder     Spastic    Erectile dysfunction     Gastric ulcer     Herniated disc     Hypertension     Low back pain     Scoliosis     Vertigo          Social History:     Social History     Socioeconomic History    Marital status:      Spouse name: Not on file    Number of children: Not on file    Years of education: Not on file    Highest education level: Not on file   Tobacco Use    Smoking status: Never Smoker    Smokeless tobacco: Never Used   Substance and Sexual Activity    Alcohol use: Yes     Comment: rare    Drug use: No        Family History:   No family history on file. Medications: Allergies   Allergen Reactions    Latex, Natural Rubber Other (comments)    Darvon [Propoxyphene] Nausea and Vomiting    Shellfish Containing Products Unknown (comments)        No current facility-administered medications for this encounter. Current Outpatient Medications   Medication Sig    apixaban (ELIQUIS) 5 mg tablet Take 5 mg by mouth two (2) times a day.  dilTIAZem (TIAZAC) 120 mg SR capsule Take 120 mg by mouth daily.  traZODone (DESYREL) 100 mg tablet Take 100 mg by mouth nightly.  BUPROPION HCL PO Take 300 mg by mouth.     pseudoephedrine (SUDAFED) 60 mg tablet Take  by mouth every four (4) hours as needed for Congestion.  traMADol (ULTRAM) 50 mg tablet Take 50 mg by mouth every six (6) hours as needed for Pain.  clonazePAM (KLONOPIN) 1 mg tablet Take  by mouth three (3) times daily.  Omeprazole delayed release (PRILOSEC D/R) 20 mg tablet Take 20 mg by mouth. Take two caps by mouth twice a day    diphenhydrAMINE (BENADRYL) 25 mg capsule Take 25 mg by mouth every six (6) hours as needed. Physical Exam:   There were no vitals taken for this visit. BP Readings from Last 3 Encounters:   09/24/19 121/76   06/19/19 103/64   05/21/19 126/73     Pulse Readings from Last 3 Encounters:   09/24/19 (!) 54   06/19/19 93   05/21/19 91     Wt Readings from Last 3 Encounters:   09/24/19 102.1 kg (225 lb)   06/19/19 101.2 kg (223 lb)   10/29/18 100.6 kg (221 lb 12.8 oz)       General:  alert, cooperative, no distress, appears stated age  Neck:  nontender  Lungs:  clear to auscultation bilaterally  Heart:  irreg, S1, S2 normal, no murmur, click, rub or gallop  Abdomen:  abdomen is soft without significant tenderness, masses, organomegaly or guarding  Extremities:  extremities normal, atraumatic, no cyanosis or edema  Skin: Warm and dry. no hyperpigmentation, vitiligo, or suspicious lesions  Neuro: alert, oriented x3, affect appropriate, no focal neurological deficits, moves all extremities well, no involuntary movements, reflexes at knee and ankle intact  Psych: non focal     Data Review:     No results for input(s): WBC, HGB, HCT, PLT, HGBEXT, HCTEXT, PLTEXT in the last 72 hours. No results for input(s): NA, K, CL, CO2, GLU, BUN, CREA, CA, MG, PHOS, ALB, TBIL, SGOT, ALT, INR, INREXT in the last 72 hours. No lab exists for component:  BNP    No results found for this or any previous visit.     All Cardiac Markers in the last 24 hours:  No results found for: CPK, CK, CKMMB, CKMB, RCK3, CKMBT, CKNDX, CKND1, TRACIE, TROPT, TROIQ, PEGGY, TROPT, TNIPOC, BNP, BNPP    Last Lipid:  No results found for: CHOL, CHOLX, CHLST, CHOLV, HDL, HDLP, LDL, LDLC, DLDLP, TGLX, TRIGL, TRIGP, CHHD, CHHDX    Signed By: Erna Cardoza MD     October 7, 2019

## 2019-10-08 ENCOUNTER — HOSPITAL ENCOUNTER (OUTPATIENT)
Age: 69
Setting detail: OUTPATIENT SURGERY
Discharge: HOME | End: 2019-10-08
Attending: INTERNAL MEDICINE | Admitting: INTERNAL MEDICINE
Payer: COMMERCIAL

## 2019-10-08 ENCOUNTER — ANESTHESIA (OUTPATIENT)
Dept: CARDIAC CATH/INVASIVE PROCEDURES | Age: 69
End: 2019-10-08
Payer: COMMERCIAL

## 2019-10-08 ENCOUNTER — ANESTHESIA EVENT (OUTPATIENT)
Dept: CARDIAC CATH/INVASIVE PROCEDURES | Age: 69
End: 2019-10-08
Payer: COMMERCIAL

## 2019-10-08 ENCOUNTER — HOSPITAL ENCOUNTER (OUTPATIENT)
Dept: NON INVASIVE DIAGNOSTICS | Age: 69
Discharge: HOME | End: 2019-10-08
Payer: COMMERCIAL

## 2019-10-08 VITALS
WEIGHT: 205 LBS | SYSTOLIC BLOOD PRESSURE: 94 MMHG | DIASTOLIC BLOOD PRESSURE: 66 MMHG | HEIGHT: 71 IN | BODY MASS INDEX: 28.7 KG/M2

## 2019-10-08 VITALS
HEIGHT: 71 IN | HEART RATE: 90 BPM | DIASTOLIC BLOOD PRESSURE: 66 MMHG | WEIGHT: 205 LBS | TEMPERATURE: 98 F | SYSTOLIC BLOOD PRESSURE: 90 MMHG | RESPIRATION RATE: 15 BRPM | OXYGEN SATURATION: 93 % | BODY MASS INDEX: 28.7 KG/M2

## 2019-10-08 DIAGNOSIS — I48.92 ATRIAL FLUTTER (HCC): ICD-10-CM

## 2019-10-08 LAB
ANION GAP SERPL CALC-SCNC: 3 MMOL/L (ref 3–18)
BUN SERPL-MCNC: 20 MG/DL (ref 7–18)
BUN/CREAT SERPL: 15 (ref 12–20)
CALCIUM SERPL-MCNC: 8.9 MG/DL (ref 8.5–10.1)
CHLORIDE SERPL-SCNC: 112 MMOL/L (ref 100–111)
CO2 SERPL-SCNC: 28 MMOL/L (ref 21–32)
CREAT SERPL-MCNC: 1.33 MG/DL (ref 0.6–1.3)
ERYTHROCYTE [DISTWIDTH] IN BLOOD BY AUTOMATED COUNT: 12.6 % (ref 11.6–14.5)
GLUCOSE SERPL-MCNC: 113 MG/DL (ref 74–99)
HCT VFR BLD AUTO: 41.5 % (ref 36–48)
HGB BLD-MCNC: 13.8 G/DL (ref 13–16)
INR PPP: 1.3 (ref 0.8–1.2)
MCH RBC QN AUTO: 31.9 PG (ref 24–34)
MCHC RBC AUTO-ENTMCNC: 33.3 G/DL (ref 31–37)
MCV RBC AUTO: 95.8 FL (ref 74–97)
PLATELET # BLD AUTO: 184 K/UL (ref 135–420)
PMV BLD AUTO: 11.2 FL (ref 9.2–11.8)
POTASSIUM SERPL-SCNC: 4.4 MMOL/L (ref 3.5–5.5)
PROTHROMBIN TIME: 15.5 SEC (ref 11.5–15.2)
RBC # BLD AUTO: 4.33 M/UL (ref 4.7–5.5)
SODIUM SERPL-SCNC: 143 MMOL/L (ref 136–145)
WBC # BLD AUTO: 10 K/UL (ref 4.6–13.2)

## 2019-10-08 PROCEDURE — 77030018729 HC ELECTRD DEFIB PAD CARD -B: Performed by: INTERNAL MEDICINE

## 2019-10-08 PROCEDURE — 77030035291 HC TBNG PMP SMARTABLATE J&J -B: Performed by: INTERNAL MEDICINE

## 2019-10-08 PROCEDURE — 77030027107 HC PTCH EXT REF CARTO3 J&J -F: Performed by: INTERNAL MEDICINE

## 2019-10-08 PROCEDURE — 74011000250 HC RX REV CODE- 250: Performed by: INTERNAL MEDICINE

## 2019-10-08 PROCEDURE — 74011000250 HC RX REV CODE- 250

## 2019-10-08 PROCEDURE — 93621 COMP EP EVL L PAC&REC C SINS: CPT | Performed by: INTERNAL MEDICINE

## 2019-10-08 PROCEDURE — C1894 INTRO/SHEATH, NON-LASER: HCPCS | Performed by: INTERNAL MEDICINE

## 2019-10-08 PROCEDURE — 85027 COMPLETE CBC AUTOMATED: CPT

## 2019-10-08 PROCEDURE — 96374 THER/PROPH/DIAG INJ IV PUSH: CPT

## 2019-10-08 PROCEDURE — 74011250636 HC RX REV CODE- 250/636

## 2019-10-08 PROCEDURE — 76060000034 HC ANESTHESIA 1.5 TO 2 HR: Performed by: INTERNAL MEDICINE

## 2019-10-08 PROCEDURE — 93613 INTRACARDIAC EPHYS 3D MAPG: CPT | Performed by: INTERNAL MEDICINE

## 2019-10-08 PROCEDURE — 80048 BASIC METABOLIC PNL TOTAL CA: CPT

## 2019-10-08 PROCEDURE — C1732 CATH, EP, DIAG/ABL, 3D/VECT: HCPCS | Performed by: INTERNAL MEDICINE

## 2019-10-08 PROCEDURE — 93653 COMPRE EP EVAL TX SVT: CPT | Performed by: INTERNAL MEDICINE

## 2019-10-08 PROCEDURE — C1730 CATH, EP, 19 OR FEW ELECT: HCPCS | Performed by: INTERNAL MEDICINE

## 2019-10-08 PROCEDURE — 85610 PROTHROMBIN TIME: CPT

## 2019-10-08 PROCEDURE — 93005 ELECTROCARDIOGRAM TRACING: CPT

## 2019-10-08 PROCEDURE — C1887 CATHETER, GUIDING: HCPCS | Performed by: INTERNAL MEDICINE

## 2019-10-08 RX ORDER — PROPOFOL 10 MG/ML
INJECTION, EMULSION INTRAVENOUS
Status: DISCONTINUED | OUTPATIENT
Start: 2019-10-08 | End: 2019-10-08 | Stop reason: HOSPADM

## 2019-10-08 RX ORDER — DIPHENHYDRAMINE HYDROCHLORIDE 50 MG/ML
12.5 INJECTION, SOLUTION INTRAMUSCULAR; INTRAVENOUS
Status: CANCELLED | OUTPATIENT
Start: 2019-10-08

## 2019-10-08 RX ORDER — NALOXONE HYDROCHLORIDE 0.4 MG/ML
0.04 INJECTION, SOLUTION INTRAMUSCULAR; INTRAVENOUS; SUBCUTANEOUS
Status: CANCELLED | OUTPATIENT
Start: 2019-10-08

## 2019-10-08 RX ORDER — LIDOCAINE HYDROCHLORIDE 10 MG/ML
INJECTION, SOLUTION EPIDURAL; INFILTRATION; INTRACAUDAL; PERINEURAL AS NEEDED
Status: DISCONTINUED | OUTPATIENT
Start: 2019-10-08 | End: 2019-10-08 | Stop reason: HOSPADM

## 2019-10-08 RX ORDER — DEXTROSE 50 % IN WATER (D50W) INTRAVENOUS SYRINGE
25-50 AS NEEDED
Status: CANCELLED | OUTPATIENT
Start: 2019-10-08

## 2019-10-08 RX ORDER — NALBUPHINE HYDROCHLORIDE 10 MG/ML
5 INJECTION, SOLUTION INTRAMUSCULAR; INTRAVENOUS; SUBCUTANEOUS
Status: CANCELLED | OUTPATIENT
Start: 2019-10-08

## 2019-10-08 RX ORDER — LIDOCAINE HYDROCHLORIDE 20 MG/ML
INJECTION, SOLUTION EPIDURAL; INFILTRATION; INTRACAUDAL; PERINEURAL AS NEEDED
Status: DISCONTINUED | OUTPATIENT
Start: 2019-10-08 | End: 2019-10-08 | Stop reason: HOSPADM

## 2019-10-08 RX ORDER — INSULIN LISPRO 100 [IU]/ML
INJECTION, SOLUTION INTRAVENOUS; SUBCUTANEOUS ONCE
Status: CANCELLED | OUTPATIENT
Start: 2019-10-08 | End: 2019-10-09

## 2019-10-08 RX ORDER — PHENYLEPHRINE HCL IN 0.9% NACL 1 MG/10 ML
SYRINGE (ML) INTRAVENOUS AS NEEDED
Status: DISCONTINUED | OUTPATIENT
Start: 2019-10-08 | End: 2019-10-08 | Stop reason: HOSPADM

## 2019-10-08 RX ORDER — MAGNESIUM SULFATE 100 %
4 CRYSTALS MISCELLANEOUS AS NEEDED
Status: CANCELLED | OUTPATIENT
Start: 2019-10-08

## 2019-10-08 RX ORDER — SODIUM CHLORIDE 0.9 % (FLUSH) 0.9 %
5-40 SYRINGE (ML) INJECTION AS NEEDED
Status: CANCELLED | OUTPATIENT
Start: 2019-10-08

## 2019-10-08 RX ORDER — SODIUM CHLORIDE 9 MG/ML
50 INJECTION, SOLUTION INTRAVENOUS CONTINUOUS
Status: DISCONTINUED | OUTPATIENT
Start: 2019-10-08 | End: 2019-10-08 | Stop reason: HOSPADM

## 2019-10-08 RX ORDER — SODIUM CHLORIDE 9 MG/ML
75 INJECTION, SOLUTION INTRAVENOUS CONTINUOUS
Status: CANCELLED | OUTPATIENT
Start: 2019-10-08

## 2019-10-08 RX ORDER — SODIUM CHLORIDE 0.9 % (FLUSH) 0.9 %
5-40 SYRINGE (ML) INJECTION EVERY 8 HOURS
Status: CANCELLED | OUTPATIENT
Start: 2019-10-08

## 2019-10-08 RX ADMIN — LIDOCAINE HYDROCHLORIDE 20 MG: 20 INJECTION, SOLUTION EPIDURAL; INFILTRATION; INTRACAUDAL; PERINEURAL at 11:25

## 2019-10-08 RX ADMIN — PROPOFOL 100 MCG/KG/MIN: 10 INJECTION, EMULSION INTRAVENOUS at 11:25

## 2019-10-08 RX ADMIN — Medication 100 MCG: at 11:35

## 2019-10-08 NOTE — ANESTHESIA PREPROCEDURE EVALUATION
Relevant Problems   No relevant active problems       Anesthetic History   No history of anesthetic complications            Review of Systems / Medical History  Patient summary reviewed and pertinent labs reviewed    Pulmonary  Within defined limits                 Neuro/Psych   Within defined limits           Cardiovascular    Hypertension: well controlled        Dysrhythmias : atrial fibrillation and atrial flutter      Exercise tolerance: <4 METS     GI/Hepatic/Renal  Within defined limits         PUD     Endo/Other  Within defined limits           Other Findings              Physical Exam    Airway  Mallampati: III  TM Distance: 4 - 6 cm  Neck ROM: decreased range of motion   Mouth opening: Normal     Cardiovascular    Rhythm: regular  Rate: normal         Dental    Dentition: Poor dentition     Pulmonary  Breath sounds clear to auscultation               Abdominal  GI exam deferred       Other Findings            Anesthetic Plan    ASA: 3  Anesthesia type: MAC            Anesthetic plan and risks discussed with: Patient

## 2019-10-08 NOTE — PROGRESS NOTES
1250 patient arrived to cath holding awake and alert, vital signs stable, right groin site clean dry and intact with no hematoma present, no C/O pain will continue to monitor.      1500 patient discharged home,vital signs stable, right groin site clean dry and intact with no hematoma present, no C/O pain

## 2019-10-08 NOTE — DISCHARGE INSTRUCTIONS
Disposition:  When discharged to home will need follow-up in my office 4 weeks. Please call my office at [phone] if any questions or concerns. Restrictions:  No driving for at least 24 hours after surgery. No heavy lifting > 10 lbs or prolonged standing, walking, or running x 1 week. OK to shower today over incision and remove dressing. Monitor groin for any signs of bleeding and please contact office at [phone] if any issues. There may be some mild bruising which is not unusual.  If any new symptoms develop, such as chest pain, dyspnea, dizziness, please contact office at [phone] immediately. ABLATION DISCHARGE INSTRUCTIONS    It is normal to feel tired the first couple days. Take it easy and follow the physicians instructions. CHECK THE CATHETER INSERTION SITE DAILY:  You may shower 24 hours after the procedure, remove the bandage during showering. Wash with soap and water and pat dry. Gentle cleaning of the site with soap and water is sufficient, cover with a dry clean dressing or bandage. Do not apply creams or powders to the area. Do not sit in a bathtub or pool of water for 7 days or until wound has completely healed. Temporary bruising and discomfort is normal and may last a few weeks. You may have a  formation of a small lump at the site which may last up to 6 weeks. CALL THE PHYSICIAN:  If the site becomes red, swollen or feels warm to the touch  If there is bleeding or drainage or if there is unusual pain at the groin or down the leg. If there is any bleeding, lie down, apply pressure or have someone apply pressure with a clean cloth until the bleeding stops. If the bleeding continues, call 911 to be transported to the hospital.  DO NOT DRIVE YOURSELF, [facility] [address]. Activity:      For the first 24-48 hours or as instructed by the physician:  No lifting, pushing or pulling over 10 pounds and no straining the insertion site.  Do not life grocery bags or the garbage can, do not run the vacuum  or  for 7 days. Start with short walks as in the hospital and gradually increase as tolerated each day. It is recommended to walk 30 minutes 5-7 days per week. Follow your physicians instructions on activity. Avoid walking outside in extremes of heat or cold. Walk inside when it is cold and windy or hot and humid. Things to keep in mind:  No driving for at least 24 hours, or as designated by your physician. Limit the number of times you go up and down the stairs  Take rests and pace yourself with activity. Be careful and do not strain with bowel movements. Medications: Take all medications as prescribed  Call your physician if you have any questions  Keep an updated list of your medications with you at all times and give a list to your physician and pharmacist    Signs and Symptoms:  Be cautious of symptoms of angina or recurrent symptoms such as chest discomfort, unusual shortness of breath or fatigue, palpitations. After Care: Follow up with your physician as instructed. Follow a heart healthy diet with proper portion control, daily stress management, daily exercise, blood pressure and cholesterol control , and smoking cessation. DISCHARGE SUMMARY from Nurse    PATIENT INSTRUCTIONS:    After general anesthesia or intravenous sedation, for 24 hours or while taking prescription Narcotics:  · Limit your activities  · Do not drive and operate hazardous machinery  · Do not make important personal or business decisions  · Do  not drink alcoholic beverages  · If you have not urinated within 8 hours after discharge, please contact your surgeon on call.     Report the following to your surgeon:  · Excessive pain, swelling, redness or odor of or around the surgical area  · Temperature over 100.5  · Nausea and vomiting lasting longer than 4 hours or if unable to take medications  · Any signs of decreased circulation or nerve impairment to extremity: change in color, persistent  numbness, tingling, coldness or increase pain  · Any questions    What to do at Home:    *  Please give a list of your current medications to your Primary Care Provider. *  Please update this list whenever your medications are discontinued, doses are      changed, or new medications (including over-the-counter products) are added. *  Please carry medication information at all times in case of emergency situations. These are general instructions for a healthy lifestyle:    No smoking/ No tobacco products/ Avoid exposure to second hand smoke  Surgeon General's Warning:  Quitting smoking now greatly reduces serious risk to your health. Obesity, smoking, and sedentary lifestyle greatly increases your risk for illness    A healthy diet, regular physical exercise & weight monitoring are important for maintaining a healthy lifestyle    You may be retaining fluid if you have a history of heart failure or if you experience any of the following symptoms:  Weight gain of 3 pounds or more overnight or 5 pounds in a week, increased swelling in our hands or feet or shortness of breath while lying flat in bed. Please call your doctor as soon as you notice any of these symptoms; do not wait until your next office visit. The discharge information has been reviewed with the patient and caregiver. The patient and caregiver verbalized understanding. Discharge medications reviewed with the patient and caregiver and appropriate educational materials and side effects teaching were provided.   ___________________________________________________________________________________________________________________________________

## 2019-10-08 NOTE — ANESTHESIA POSTPROCEDURE EVALUATION
Procedure(s):  ABLATION A-FLUTTER/MARKUS with anesthesia prior  Lt Atrial Pace & Record During Ep Study.     MAC    Anesthesia Post Evaluation      Multimodal analgesia: multimodal analgesia used between 6 hours prior to anesthesia start to PACU discharge  Patient location during evaluation: bedside  Patient participation: complete - patient participated  Level of consciousness: awake  Pain score: 0  Pain management: adequate  Airway patency: patent  Anesthetic complications: no  Cardiovascular status: stable  Respiratory status: acceptable  Hydration status: acceptable  Post anesthesia nausea and vomiting:  controlled      Vitals Value Taken Time   /65 10/8/2019 12:52 PM   Temp 36.7 °C (98 °F) 10/8/2019 12:52 PM   Pulse 71 10/8/2019 12:52 PM   Resp 15 10/8/2019 12:52 PM   SpO2 98 % 10/8/2019 12:52 PM

## 2019-10-10 LAB
ATRIAL RATE: 85 BPM
CALCULATED P AXIS, ECG09: 55 DEGREES
CALCULATED R AXIS, ECG10: -5 DEGREES
CALCULATED T AXIS, ECG11: 37 DEGREES
DIAGNOSIS, 93000: NORMAL
P-R INTERVAL, ECG05: 186 MS
Q-T INTERVAL, ECG07: 382 MS
QRS DURATION, ECG06: 96 MS
QTC CALCULATION (BEZET), ECG08: 454 MS
VENTRICULAR RATE, ECG03: 85 BPM

## 2019-11-16 ENCOUNTER — TELEPHONE (OUTPATIENT)
Dept: CARDIOLOGY CLINIC | Age: 69
End: 2019-11-16

## 2019-11-16 NOTE — TELEPHONE ENCOUNTER
Pt called answering service, home alone. Several complaints. Says SBP 80s, HR 80s but feels dizzy, and \"heart hurting. \" No family to help, so unfortunately asked him to call 911 and needs to be evaluated for CP. He expressed understanding.

## 2019-12-03 ENCOUNTER — OFFICE VISIT (OUTPATIENT)
Dept: ORTHOPEDIC SURGERY | Age: 69
End: 2019-12-03

## 2019-12-03 ENCOUNTER — TELEPHONE (OUTPATIENT)
Dept: ORTHOPEDIC SURGERY | Age: 69
End: 2019-12-03

## 2019-12-03 VITALS
BODY MASS INDEX: 30.41 KG/M2 | OXYGEN SATURATION: 95 % | RESPIRATION RATE: 18 BRPM | SYSTOLIC BLOOD PRESSURE: 130 MMHG | HEART RATE: 121 BPM | WEIGHT: 217.2 LBS | TEMPERATURE: 97.7 F | DIASTOLIC BLOOD PRESSURE: 79 MMHG | HEIGHT: 71 IN

## 2019-12-03 DIAGNOSIS — M41.50 DEGENERATIVE SCOLIOSIS: Primary | ICD-10-CM

## 2019-12-03 DIAGNOSIS — M48.061 FORAMINAL STENOSIS OF LUMBAR REGION: ICD-10-CM

## 2019-12-03 NOTE — TELEPHONE ENCOUNTER
I spoke with Dr Hannah Keene and he said there is no possibility of the patient have any surgery in December. The pt has to have a cardiac work up and clearance by his cardiologist.  He has to have a brain MRI, cervical MRI, lumbar MRI and lumbar CT before anything can be considered. They are not even ordered yet as Dr Hannah Keene told him he would see him back in January to discuss all this and order what is needed.

## 2019-12-03 NOTE — TELEPHONE ENCOUNTER
Patient called again and said that the other number he has keeps dropping calls, to please call him back on his cell. Patient cell # [phone].

## 2019-12-03 NOTE — PROGRESS NOTES
1. Have you been to the ER, urgent care clinic since your last visit? Hospitalized since your last visit? No    2. Have you seen or consulted any other health care providers outside of the [address] [place] [address] since your last visit? Include any pap smears or colon screening.  No

## 2019-12-03 NOTE — PROGRESS NOTES
[address]  [address], [address]  [place], [address] [place] [address] Street  Phone: [phone]  Fax: [phone]  PROGRESS NOTE  Patient: Lisa Norris                MRN: [id number]       SSN: [id number]  YOB: 1950        AGE: 77 y.o. SEX: male  Body mass index is 30.29 kg/m². PCP: Ted Soliman MD  12/03/19    No chief complaint on file. HISTORY OF PRESENT ILLNESS, RADIOGRAPHS, and PLAN:     HISTORY OF PRESENT ILLNESS:  Mr. Gabriele Rehman returns today. He has had quite an endeavor. He ultimately saw a cardiologist.  They found him to be in atrial flutter. Dr. Dee Cesar at [facility] performed an ablation, which seemed to help, though he has had once recurrence of symptoms with chest pain. ASSESSMENT/PLAN: I talked to the patient at length. He has a follow up appointment with Dr. Dee Cesar. He reports that he saw Dr. Richard Huizar, and Dr. Richard Huizar gave him a hip injection, which has eased his left hip arthritic symptoms. That is a sign that this hip pain is secondary to the hip arthritis. My sense is that he will require a hip replacement at some point. He continues to be fixated on his right-duque list in his lumbar spine. I went over an old CT scan with him, and has had some mild obliquity at the L5-S1 disc. His significant obliquity is a right-duque tilt at the L3-4 disc, the junctional segment above his fusion. There appears to be a tethered osteophyte on the right. There is also a degenerative L2-3 segment, but the prime scoliosis is coming from this L3-4 segment with some marginal inclusion of the L5-S1 segment, but I cannot tell how much of that is simply due to the imbalance generated from L3-4 causing additional wear at L5-S1.   I discussed with him what surgery would be for that, which in my hands would be an attempted XLIF at L3-4 with division of that laterally-based osteophyte in an attempt to normalize the angulation of this segment with revision posterior instrumentation. Included in that may be L2-3, L5-S1 depending upon preoperative studies and his overall health. On exam today, he appears to have a mild left facial droop, maybe a Bob syndrome. He holds his neck cocked to the right. He can only neutralize it to neutral.  He has no left-duque bending. He denies any headache, any neurologic symptoms, changes in vision, etcetera. I explained to him the complexity of what is going on. Before we would commence with any considerations of surgery, he needs to have his cardiac status evaluated. I want to get current studies of his lumbar spine, his cervical spine, and his brain. I am uncertain how much pain is emanating from his thoracic spine, though I do agree that is that it creates a ____________ issue, and he has this deformity with this right-duque list emanating from this L3-4 segment. He is about to embark on a trip to [facility] where his fiancé lives. I will see him back on his return, and we will begin to obtain the necessary studies should he desire. cc:  David Gutierrez. Mayte Sinha M.D. Past Medical History:   Diagnosis Date    Atrial flutter ([facility] [address]     S/P Flutter ablation (09/19)    Colon disorder     Spastic    Erectile dysfunction     Gastric ulcer     Herniated disc     Hypertension     Low back pain     Scoliosis     Vertigo        No family history on file. Current Outpatient Medications   Medication Sig Dispense Refill    apixaban (ELIQUIS) 5 mg tablet Take 5 mg by mouth two (2) times a day.  dilTIAZem (TIAZAC) 120 mg SR capsule Take 120 mg by mouth daily.  traZODone (DESYREL) 100 mg tablet Take 100 mg by mouth nightly.  BUPROPION HCL PO Take 300 mg by mouth.  pseudoephedrine (SUDAFED) 60 mg tablet Take  by mouth every four (4) hours as needed for Congestion.  traMADol (ULTRAM) 50 mg tablet Take 50 mg by mouth every six (6) hours as needed for Pain.       clonazePAM (KLONOPIN) 1 mg tablet Take  by mouth three (3) times daily.  Omeprazole delayed release (PRILOSEC D/R) 20 mg tablet Take 20 mg by mouth. Take two caps by mouth twice a day      diphenhydrAMINE (BENADRYL) 25 mg capsule Take 25 mg by mouth every six (6) hours as needed.            Allergies   Allergen Reactions    Latex, Natural Rubber Other (comments)    Darvon [Propoxyphene] Nausea and Vomiting    Shellfish Containing Products Unknown (comments)       Past Surgical History:   Procedure Laterality Date    HX BACK SURGERY  06/03/1999    HX BACK SURGERY  05/22/2015    HX HEENT      Nasal    HX KNEE ARTHROSCOPY      Rt knee    HX ORTHOPAEDIC      HX ORTHOPAEDIC      Lt ankle    HX SHOULDER ARTHROSCOPY      Rt shoulder    ME COMPRE ELECTROPHYSIOL XM W/LEFT ATRIAL PACNG/REC N/A 10/8/2019    Lt Atrial Pace & Record During Ep Study performed by Ramsey Carlin MD at [facility] CATH LAB    ME EPHYS EVAL W/ABLATION [address] Right 10/8/2019    ABLATION A-FLUTTER/MARKUS with anesthesia prior performed by Ramsey Carlin MD at [facility] CATH LAB       Past Medical History:   Diagnosis Date    Atrial flutter ([facility] [address]     S/P Flutter ablation (09/19)    Colon disorder     Spastic    Erectile dysfunction     Gastric ulcer     Herniated disc     Hypertension     Low back pain     Scoliosis     Vertigo        Social History     Socioeconomic History    Marital status:      Spouse name: Not on file    Number of children: Not on file    Years of education: Not on file    Highest education level: Not on file   Occupational History    Not on file   Social Needs    Financial resource strain: Not on file    Food insecurity:     Worry: Not on file     Inability: Not on file    Transportation needs:     Medical: Not on file     Non-medical: Not on file   Tobacco Use    Smoking status: Never Smoker    Smokeless tobacco: Never Used   Substance and Sexual Activity    Alcohol use: Yes     Comment: rare  Drug use: No    Sexual activity: Not on file   Lifestyle    Physical activity:     Days per week: Not on file     Minutes per session: Not on file    Stress: Not on file   Relationships    Social connections:     Talks on phone: Not on file     Gets together: Not on file     Attends Jehovah's witness service: Not on file     Active member of club or organization: Not on file     Attends meetings of clubs or organizations: Not on file     Relationship status: Not on file    Intimate partner violence:     Fear of current or ex partner: Not on file     Emotionally abused: Not on file     Physically abused: Not on file     Forced sexual activity: Not on file   Other Topics Concern    Not on file   Social History Narrative    Not on file         REVIEW OF SYSTEMS:   CONSTITUTIONAL SYMPTOMS:  Negative. EYES:  Negative. EARS, NOSE, THROAT AND MOUTH:  Negative. CARDIOVASCULAR:  Negative. RESPIRATORY:  Negative. GENITOURINARY: Per HPI. GASTROINTESTINAL:  Per HPI. INTEGUMENTARY (SKIN AND/OR BREAST):  Negative. MUSCULOSKELETAL: Per HPI.   ENDOCRINE/RHEUMATOLOGIC:  Negative. NEUROLOGICAL:  Per HPI. HEMATOLOGIC/LYMPHATIC:  Negative. ALLERGIC/IMMUNOLOGIC:  Negative. PSYCHIATRIC:  Negative. PHYSICAL EXAMINATION:   Visit Vitals  /79   Pulse (!) 121   Temp 97.7 °F (36.5 °C) (Oral)   Resp 18   Ht 5' 11\" (1.803 m)   Wt 217 lb 3.2 oz (98.5 kg)   SpO2 95%   BMI 30.29 kg/m²    PAIN SCALE: 6/10    CONSTITUTIONAL: The patient is in no apparent distress and is alert and oriented x 3. HEENT: Normocephalic. Hearing grossly intact. NECK: Supple and symmetric. no tenderness, or masses were felt. RESPIRATORY: No labored breathing. CARDIOVASCULAR: The carotid pulses were normal. Peripheral pulses were 2+. CHEST: Normal AP diameter and normal contour without any kyphoscoliosis. LYMPHATIC: No lymphadenopathy was appreciated in the neck, axillae or groin.   SKIN:  Negative for scars, rashes, lesions, or ulcers on the right upper, right lower, left upper, left lower and trunk. NEUROLOGICAL: Alert and oriented x 3. Ambulation without assistive device. FWB. EXTREMITIES: See musculoskeletal.  MUSCULOSKELETAL:   Head and Neck:  Negative for misalignment, asymmetry, crepitation, defects, tenderness masses or effusions.  Left Upper Extremity: Inspection, percussion and palpation performed. Angeless sign is negative.  Right Upper Extremity: Inspection, percussion and palpation performed. Angeless sign is negative.  Spine, Ribs and Pelvis: Back and left hip pain. Inspection, percussion and palpation performed. Negative for misalignment, asymmetry, crepitation, defects, tenderness masses or effusions.  Left Lower Extremity: Inspection, percussion and palpation performed. Negative straight leg raise.  Right Lower Extremity: Inspection, percussion and palpation performed. Negative straight leg raise. SPINE EXAM:     Cervical spine: Neck is midline. Normal muscle tone. No focal atrophy is noted. Lumbar spine: No rash, ecchymosis, or gross obliquity. No focal atrophy is noted. ASSESSMENT    ICD-10-CM ICD-9-CM    1. Degenerative scoliosis M41.50 737.30    2. Foraminal stenosis of lumbar region M48.061 724.02        Written by Author Will, as dictated by Wagner Johnson MD.    I, Dr. Wagner Johnson MD, confirm that all documentation is accurate.

## 2019-12-03 NOTE — TELEPHONE ENCOUNTER
Patient is calling in regards to appointment for today. Patient is interested in having surgery in the month of December. Stated that he knows that surgery is needed and that it will take place. Patient is requesting for MRI/CT order be sent MRI and CT diagnostic.      Please advise    Best contact number for patient [phone]

## 2019-12-03 NOTE — TELEPHONE ENCOUNTER
Spoke with patient, informed of Dr. Crystal Erazo message below. Patient stated understanding, no further actions needed at this time.

## 2020-01-01 NOTE — Clinical Note
All catheters removed.
Bilateral groin clipped, prepped with ChloraPrep and draped. Wet prep solution applied at: 1142. Wet prep solution dried at: 1145. Wet prep elapsed drying time: 3 mins.
Defibrillation and grounding pads were applied.
EP catheter inserted in the CS.
EP catheter inserted in the His.
EP catheter inserted in the RA.
EP catheter inserted in the RA.
EP catheter removed.
History and physical documented and up to date, allergies reviewed, lab results reviewed, pre-procedure education provided, patient verbalized understanding of procedure, procedural consent signed and patient is NPO.
ID band present and verified. Family is not present.
No contrast administered during procedure. Amount: 0 mL.
No specimen collected. Estimated Blood Loss: <30 mL.
Right femoral vein. Accessed successfully. using ultrasound guidance.  Number of attempts =  1.
Sheath #1: Sheath: inserted. Sheath inserted/placed in the right femoral  vein. Hemostasis achieved.
Sheath #2: Sheath: inserted. Sheath inserted/placed in the right femoral  vein. Hemostasis achieved.
Sheath #3: Sheath: inserted. Sheath inserted/placed in the right femoral  vein. Hemostasis achieved.
Sheath #3: sheath exchanged for CATH [address] -- [facility]. Hemostasis achieved.
Sheath #all: Presents as clean, dry, & intact, no bleeding and no hematoma.
Sheath #all: Sheath: removed. Hemostasis achieved.
TRANSFER - IN REPORT:  
 
Verbal report received from: Luis Alberto Lao RN. Report consisted of patient's Situation, Background, Assessment and  
Recommendations(SBAR). Opportunity for questions and clarification was provided. Assessment completed upon patient's arrival to unit and care assumed. Patient transported with a Cardiac Cath Tech / Patient Care Tech.
TRANSFER - OUT REPORT:  
 
Verbal report given to: Romel Santiago RN. Report consisted of patient's Situation, Background, Assessment and  
Recommendations(SBAR). Opportunity for questions and clarification was provided. Patient transported with a Cardiac Cath Tech / Patient Care Tech. Patient transported to: [address].
The physician has been notified.
Yes

## 2020-01-14 ENCOUNTER — OFFICE VISIT (OUTPATIENT)
Dept: ORTHOPEDIC SURGERY | Age: 70
End: 2020-01-14

## 2020-01-14 VITALS
OXYGEN SATURATION: 94 % | HEIGHT: 70 IN | DIASTOLIC BLOOD PRESSURE: 73 MMHG | BODY MASS INDEX: 32.38 KG/M2 | RESPIRATION RATE: 16 BRPM | TEMPERATURE: 97.5 F | WEIGHT: 226.2 LBS | SYSTOLIC BLOOD PRESSURE: 111 MMHG | HEART RATE: 80 BPM

## 2020-01-14 DIAGNOSIS — M41.50 DEGENERATIVE SCOLIOSIS: Primary | ICD-10-CM

## 2020-01-14 DIAGNOSIS — M48.061 FORAMINAL STENOSIS OF LUMBAR REGION: ICD-10-CM

## 2020-01-14 NOTE — PROGRESS NOTES
[address]  [address], [address]  [place], [address] [place] [address] Street  Phone: [phone]  Fax: [phone]  PROGRESS NOTE  Patient: Salena Werner                MRN: [id number]       SSN: [id number]  YOB: 1950        AGE: 77 y.o. SEX: male  Body mass index is 32.46 kg/m². PCP: Ata Abel MD  01/14/20    Chief Complaint   Patient presents with    LOW BACK PAIN    Follow-up       HISTORY OF PRESENT ILLNESS, RADIOGRAPHS, and PLAN:     HISTORY OF PRESENT ILLNESS:  Mr. Juana Zapien returns today. His back pain is worsening. He gets neurogenic claudicative type symptoms in his legs. He wants to undergo surgery to address this but also really to address the deformity he has, which has a distinct right-duque tilt. This is due to both leg length discrepancy and some hip arthritis but is primarily due to an angular deformity that has developed superior to this L4-5 fusion from years ago. ASSESSMENT/PLAN: I discussed the matter at length with him. We are going to get a current MRI and CT scan of his lumbar spine to fully understand his anatomy, but my concern is that he would probably require a fusion with interbody approaches to improve his lordosis, resolve this scoliosis, and his overall coronal alignment. It is quite an extensive surgery for a man of his age, though he is highly motivated. In my hands, I would likely do an XLIF at L3-4, possibly an ALIF at L5-S1, and a posterior fusion from L2 to the sacrum to try to provide lumbar flexibility and resolve his scoliotic curvature with rigid fixation. I explained to him my hesitation to putting him through such a surgery and my concern that his expectations are not in line with what I see. I am going to have him seen by Dr. Rome El at the [facility] spine center to get a tertiary center opinion as to what could be done to address his concerns.   Obviously, if he decides to have them care for him, I would be happy to provide local care. I will obtain a current CT scan and an MRI prior to his referral.     cc:  Alexys Acosta. Trevor Cortes M.D. Past Medical History:   Diagnosis Date    Atrial flutter ([address]     S/P Flutter ablation (09/19)    Colon disorder     Spastic    Erectile dysfunction     Gastric ulcer     Herniated disc     Hypertension     Low back pain     Scoliosis     Vertigo        No family history on file. Current Outpatient Medications   Medication Sig Dispense Refill    apixaban (ELIQUIS) 5 mg tablet Take 5 mg by mouth two (2) times a day.  dilTIAZem (TIAZAC) 120 mg SR capsule Take 120 mg by mouth daily.  traZODone (DESYREL) 100 mg tablet Take 100 mg by mouth nightly.  BUPROPION HCL PO Take 300 mg by mouth.  pseudoephedrine (SUDAFED) 60 mg tablet Take  by mouth every four (4) hours as needed for Congestion.  traMADol (ULTRAM) 50 mg tablet Take 50 mg by mouth every six (6) hours as needed for Pain.  clonazePAM (KLONOPIN) 1 mg tablet Take  by mouth three (3) times daily.  Omeprazole delayed release (PRILOSEC D/R) 20 mg tablet Take 20 mg by mouth. Take two caps by mouth twice a day      diphenhydrAMINE (BENADRYL) 25 mg capsule Take 25 mg by mouth every six (6) hours as needed.            Allergies   Allergen Reactions    Latex, Natural Rubber Other (comments)    Cephalexin Other (comments)    Darvon [Propoxyphene] Nausea and Vomiting    Shellfish Containing Products Unknown (comments)       Past Surgical History:   Procedure Laterality Date    HX BACK SURGERY  06/03/1999    HX BACK SURGERY  05/22/2015    HX HEENT      Nasal    HX KNEE ARTHROSCOPY      Rt knee    HX ORTHOPAEDIC      HX ORTHOPAEDIC      Lt ankle    HX SHOULDER ARTHROSCOPY      Rt shoulder    PA COMPRE ELECTROPHYSIOL XM W/LEFT ATRIAL PACNG/REC N/A 10/8/2019    Lt Atrial Pace & Record During Ep Study performed by Kwesi [facility]roy [facility] at [facility] CATH LAB    PA LESLIE CORTES W/ABLATION [address]  Right 10/8/2019    ABLATION A-FLUTTER/MARKUS with anesthesia prior performed by Kwesi Monroy MD at [facility] CATH LAB       Past Medical History:   Diagnosis Date    Atrial flutter ([address]     S/P Flutter ablation (09/19)    Colon disorder     Spastic    Erectile dysfunction     Gastric ulcer     Herniated disc     Hypertension     Low back pain     Scoliosis     Vertigo        Social History     Socioeconomic History    Marital status:      Spouse name: Not on file    Number of children: Not on file    Years of education: Not on file    Highest education level: Not on file   Occupational History    Not on file   Social Needs    Financial resource strain: Not on file    Food insecurity:     Worry: Not on file     Inability: Not on file    Transportation needs:     Medical: Not on file     Non-medical: Not on file   Tobacco Use    Smoking status: Never Smoker    Smokeless tobacco: Never Used   Substance and Sexual Activity    Alcohol use: Yes     Comment: rare    Drug use: No    Sexual activity: Not on file   Lifestyle    Physical activity:     Days per week: Not on file     Minutes per session: Not on file    Stress: Not on file   Relationships    Social connections:     Talks on phone: Not on file     Gets together: Not on file     Attends Hoahaoism service: Not on file     Active member of club or organization: Not on file     Attends meetings of clubs or organizations: Not on file     Relationship status: Not on file    Intimate partner violence:     Fear of current or ex partner: Not on file     Emotionally abused: Not on file     Physically abused: Not on file     Forced sexual activity: Not on file   Other Topics Concern    Not on file   Social History Narrative    Not on file         REVIEW OF SYSTEMS:   CONSTITUTIONAL SYMPTOMS:  Negative. EYES:  Negative. EARS, NOSE, THROAT AND MOUTH:  Negative. CARDIOVASCULAR:  Negative.    RESPIRATORY: Negative. GENITOURINARY: Per HPI. GASTROINTESTINAL:  Per HPI. INTEGUMENTARY (SKIN AND/OR BREAST):  Negative. MUSCULOSKELETAL: Per HPI.   ENDOCRINE/RHEUMATOLOGIC:  Negative. NEUROLOGICAL:  Per HPI. HEMATOLOGIC/LYMPHATIC:  Negative. ALLERGIC/IMMUNOLOGIC:  Negative. PSYCHIATRIC:  Negative. PHYSICAL EXAMINATION:   Visit Vitals  /73   Pulse 80   Temp 97.5 °F (36.4 °C) (Oral)   Resp 16   Ht 5' 10\" (1.778 m)   Wt 226 lb 3.2 oz (102.6 kg)   SpO2 94%   BMI 32.46 kg/m²    PAIN SCALE: /10    CONSTITUTIONAL: The patient is in no apparent distress and is alert and oriented x 3. HEENT: Normocephalic. Hearing grossly intact. NECK: Supple and symmetric. no tenderness, or masses were felt. RESPIRATORY: No labored breathing. CARDIOVASCULAR: The carotid pulses were normal. Peripheral pulses were 2+. CHEST: Normal AP diameter and normal contour without any kyphoscoliosis. LYMPHATIC: No lymphadenopathy was appreciated in the neck, axillae or groin. SKIN:  Negative for scars, rashes, lesions, or ulcers on the right upper, right lower, left upper, left lower and trunk. NEUROLOGICAL: Alert and oriented x 3. Ambulation with single point cane. FWB. EXTREMITIES: See musculoskeletal.  MUSCULOSKELETAL:   Head and Neck:  Negative for misalignment, asymmetry, crepitation, defects, tenderness masses or effusions.  Left Upper Extremity: Inspection, percussion and palpation performed. Angeless sign is negative.  Right Upper Extremity: Inspection, percussion and palpation performed. Angeless sign is negative.  Spine, Ribs and Pelvis: Back pain. Inspection, percussion and palpation performed. Negative for misalignment, asymmetry, crepitation, defects, tenderness masses or effusions.  Left Lower Extremity: Numbness. Inspection, percussion and palpation performed. Negative straight leg raise.  Right Lower Extremity: Numbness. Inspection, percussion and palpation performed.    Negative straight leg raise. SPINE EXAM:     Cervical spine: Neck is midline. Normal muscle tone. No focal atrophy is noted. Lumbar spine: No rash, ecchymosis, or gross obliquity. No focal atrophy is noted. ASSESSMENT    ICD-10-CM ICD-9-CM    1. Degenerative scoliosis M41.50 737.30 AMB POC XRAY, SPINE, LUMBOSACRAL; 4+      CT SPINE LUMB WO CONT      REFERRAL TO SPINE SURGERY      MRI LUMB SPINE W WO CONT   2. Foraminal stenosis of lumbar region M48.061 724.02 AMB POC XRAY, SPINE, LUMBOSACRAL; 4+      CT SPINE LUMB WO CONT      REFERRAL TO SPINE SURGERY      MRI LUMB SPINE W WO CONT       Written by Quiana Stewart, as dictated by Michael Cardenas MD.    I, Dr. Michael Cardenas MD, confirm that all documentation is accurate.

## 2020-01-17 ENCOUNTER — DOCUMENTATION ONLY (OUTPATIENT)
Dept: ORTHOPEDIC SURGERY | Age: 70
End: 2020-01-17

## 2020-01-17 NOTE — PROGRESS NOTES
Order and office notes faxed to MRI/CT Diagnostic Scheduling to have them set up MRI & CT of L-Spine and to notify patient of date. They will authorize with insurance if needed. Patient aware.

## 2020-01-27 ENCOUNTER — DOCUMENTATION ONLY (OUTPATIENT)
Dept: ORTHOPEDIC SURGERY | Age: 70
End: 2020-01-27

## 2020-01-30 ENCOUNTER — OFFICE VISIT (OUTPATIENT)
Dept: CARDIOLOGY CLINIC | Age: 70
End: 2020-01-30

## 2020-01-30 VITALS
WEIGHT: 232 LBS | OXYGEN SATURATION: 95 % | DIASTOLIC BLOOD PRESSURE: 74 MMHG | SYSTOLIC BLOOD PRESSURE: 118 MMHG | HEIGHT: 70 IN | BODY MASS INDEX: 33.21 KG/M2 | HEART RATE: 72 BPM

## 2020-01-30 DIAGNOSIS — Z01.810 PREOP CARDIOVASCULAR EXAM: Primary | ICD-10-CM

## 2020-01-30 DIAGNOSIS — Z98.890 H/O CARDIAC RADIOFREQUENCY ABLATION: ICD-10-CM

## 2020-01-30 DIAGNOSIS — Z79.01 ANTICOAGULATED: ICD-10-CM

## 2020-01-30 DIAGNOSIS — I48.92 ATRIAL FLUTTER, UNSPECIFIED TYPE (HCC): ICD-10-CM

## 2020-01-30 NOTE — PROGRESS NOTES
HPI: A 73-year old male here for follow up. Overall he is doing well. He is planning for spine surgery by Dr. Vijay Marley or [facility]. He previously had seen Dr. Michelle Dee. I performed atrial flutter ablation in October. A few weeks after that he did have some palpitations and chest pain. He was seen at a [facility] facility and everything was fine and he has not had a recurrence. He has been doing well without any new chest pain, dyspnea, PND, orthopnea or edema. He normally has been followed at the [place] system. He has been taking Eliquis without any bleeding issues. Impression/Plan:  1. Preoperative evaluation for possible spine surgery by Dr. Vijay Marley or [facility]. It is reasonable to hold his Eliquis for seven days but I would monitor on telemetry periprocedure. 2. History of atrial flutter with ablation October 2019, stable. He is taking diltiazem and Eliquis. 3. History of echocardiogram at the [facility], normal function by report. 4. Chronic back pain and limited functional status. 5. History of hypertension. 6. History of scoliosis. 7. Remote gastric ulcer. 8. Shellfish allergy. His functional status is somewhat limited but he has no symptoms of unstable angina. He needs spinal surgery and any intervention would require anticoagulation ideally for 6-12 months or potentially one month if it is a bare metal stent. However, with the lack of symptoms he can proceed with surgery. I will see him back in six months. Past Medical History:   Diagnosis Date    Atrial flutter (HCC)     S/P Flutter ablation (09/19)    Colon disorder     Spastic    Erectile dysfunction     Gastric ulcer     Herniated disc     Hypertension     Low back pain     Scoliosis     Vertigo        Current Outpatient Medications   Medication Sig Dispense Refill    apixaban (ELIQUIS) 5 mg tablet Take 1 Tab by mouth two (2) times a day. 60 Tab 3    dilTIAZem (TIAZAC) 120 mg SR capsule Take 120 mg by mouth daily.       traZODone (DESYREL) 100 mg tablet Take 100 mg by mouth nightly.  BUPROPION HCL PO Take 300 mg by mouth.  pseudoephedrine (SUDAFED) 60 mg tablet Take  by mouth every four (4) hours as needed for Congestion.  traMADol (ULTRAM) 50 mg tablet Take 50 mg by mouth every six (6) hours as needed for Pain.  clonazePAM (KLONOPIN) 1 mg tablet Take  by mouth three (3) times daily.  Omeprazole delayed release (PRILOSEC D/R) 20 mg tablet Take 20 mg by mouth. Take two caps by mouth twice a day      diphenhydrAMINE (BENADRYL) 25 mg capsule Take 25 mg by mouth every six (6) hours as needed. Social History   reports that he has never smoked. He has never used smokeless tobacco.   reports current alcohol use. Family History  family history is not on file. Review of Systems  Except as stated above include:  Constitutional: Negative for fever, chills and malaise/fatigue. HEENT: No congestion or recent URI. Gastrointestinal: No nausea, vomiting, abdominal pain, bloody stools. Pulmonary:  Negative except as stated above. Cardiac:  Negative except as stated above. Musculoskeletal: Negative except as stated above. Neurological:  No localized symptoms. Skin:  Negative except as stated above. Psych:  Negative except as stated above. Endocrine:  Negative except as stated above. PHYSICAL EXAM  BP Readings from Last 3 Encounters:   01/30/20 118/74   01/14/20 111/73   12/03/19 130/79     Pulse Readings from Last 3 Encounters:   01/30/20 72   01/14/20 80   12/03/19 (!) 121     Wt Readings from Last 3 Encounters:   01/30/20 105.2 kg (232 lb)   01/14/20 102.6 kg (226 lb 3.2 oz)   12/03/19 98.5 kg (217 lb 3.2 oz)     General:   Well developed, well groomed. Head/Neck:   No jugular venous distention     No carotid bruits. No evidence of xanthelasma. Lungs:   No respiratory distress. Clear bilaterally. Heart:    Regular rate and rhythm. Normal S1/S2.       Palpation of heart with normal point of maximum impulse. No significant murmurs, rubs or gallops. Abdomen:   Soft and nontender. No palpable abdominal mass or bruits. Extremities:   Intact peripheral pulses. No significant edema. Neurological:   Alert and oriented to person, place, time. No focal neurological deficit visually. Skin:   No obvious rash    Blood Pressure Metric:  Monitor recommended and adjustments stated if needed.

## 2020-01-30 NOTE — LETTER
1/30/2020 12:25 PM 
 
Mr. Alfie Jeter [address] [facility] [address] Alfie Villegas was seen in our office on 1/30/20 for cardiac evaluation. From a cardiac standpoint he is cleared for spinal surgery with Dr. Amberly Fernandez. He may hold his Eliquis for 7 days before surgery. Please feel free to contact our office if you have any questions regarding this patient. Sincerely,       Pati Leonardo MD

## 2020-01-30 NOTE — PROGRESS NOTES
Edi Done presents today for   Chief Complaint   Patient presents with    Irregular Heart Beat     follow up per patient    Dizziness     sometimes    Leg Swelling     mild       Vannie Done preferred language for health care discussion is english/other. Is someone accompanying this pt? no    Is the patient using any DME equipment during [address]? no    Depression Screening:  3 most recent PHQ Screens 1/30/2020   Little interest or pleasure in doing things Not at all   Feeling down, depressed, irritable, or hopeless Not at all   Total Score PHQ 2 0       Learning Assessment:  Learning Assessment 1/30/2020   PRIMARY LEARNER Patient   PRIMARY LANGUAGE ENGLISH   LEARNER PREFERENCE PRIMARY DEMONSTRATION   ANSWERED BY Patient   RELATIONSHIP SELF       Abuse Screening:  No flowsheet data found. Fall Risk  Fall Risk Assessment, last 12 mths 1/30/2020   Able to walk? Yes   Fall in past 12 months? No   Fall with injury? -   Number of falls in past 12 months -   Fall Risk Score -       Pt currently taking Anticoagulant therapy? Eliquis     Coordination of Care:  1. Have you been to the ER, urgent care clinic since your last visit? Hospitalized since your last visit? no    2. Have you seen or consulted any other health care providers outside of the [address] [place] [address] since your last visit? Include any pap smears or colon screening.  no

## 2020-02-04 ENCOUNTER — OFFICE VISIT (OUTPATIENT)
Dept: ORTHOPEDIC SURGERY | Age: 70
End: 2020-02-04

## 2020-02-04 VITALS
RESPIRATION RATE: 20 BRPM | HEIGHT: 70 IN | DIASTOLIC BLOOD PRESSURE: 72 MMHG | OXYGEN SATURATION: 95 % | WEIGHT: 231.8 LBS | BODY MASS INDEX: 33.18 KG/M2 | SYSTOLIC BLOOD PRESSURE: 117 MMHG | TEMPERATURE: 98.3 F | HEART RATE: 83 BPM

## 2020-02-04 DIAGNOSIS — M41.50 DEGENERATIVE SCOLIOSIS: Primary | ICD-10-CM

## 2020-02-04 DIAGNOSIS — M48.061 FORAMINAL STENOSIS OF LUMBAR REGION: ICD-10-CM

## 2020-02-04 NOTE — LETTER
N 
 
2/4/2020 2:36 PM 
 
Mr. Gasper Jeter [address] [facility] [address] To Whom It May Concern: 
 
Gasper Garcia is currently under the care of [facility]. He is medically unable to take is flight due to his spinal injury. Please provide him a refund, if possible. If there are questions or concerns please have the patient contact our office.  
 
 
 
Sincerely, 
 
 
 
 
Seferino Castellano MD

## 2020-02-04 NOTE — PROGRESS NOTES
[address]  [address]  [address]  Phone: [phone]  Fax: [phone]  PROGRESS NOTE  Patient: Miguel Eisenberg                MRN: [id number]       SSN: [id number]  YOB: 1950        AGE: 77 y.o. SEX: male  Body mass index is 33.26 kg/m². PCP: Steve Hernandez MD  02/04/20    Chief Complaint   Patient presents with    Back Pain    Follow-up     MRI & CT        HISTORY OF PRESENT ILLNESS, RADIOGRAPHS, and PLAN:     HISTORY OF PRESENT ILLNESS:  Mr. Ann Marie Maldonado returns today. He brings a current MRI and CT scan. I reviewed his studies. There has been some progression of disease. He now has a clear, significant disc herniation at L1-2 causing some relative stenosis, as well as degenerative disease in the other levels superior to his previous 4-5 fusion. The CT scan demonstrates what appears to be just lying down on the CT table some resolution of his lateral tilt to the right but a significant flat back deformity across his whole lumbar spine. He is hypolordotic. ASSESSMENT/PLAN: Looking at this, if we were to try to address his claudicative symptoms, as well as his deformity, I think it would require a thoracolumbosacral fusion. I probably would still address L5-S1 with an ALIF to try to improve his overall lordosis in the most dramatic fashion. I am not certain doing an XLIF L3-4 may help with some of the lateral tilt, but I do not believe it would address overall his sagittal balance. Posteriorly, he would need instrumentation and decompression going into his thoracic spine. In other hands, a PSO may be considered to try to help his flat back and his lateral tilt. Such sense of deformity correction I would leave to the judgement of the team at [facility].   I have already made the referral.  I have explained to the patient that while there are efforts I could do to improve his alignment and provide decompression and fusion to his thoracic spine, but a tertiary center, such as [facility], has the ability to be more aggressive with these operations and may provide him a better result. We will proceed with the referral process. cc: Faheem Moscoso M.D. Past Medical History:   Diagnosis Date    Atrial flutter ([address]     S/P Flutter ablation (09/19)    Colon disorder     Spastic    Erectile dysfunction     Gastric ulcer     Herniated disc     Hypertension     Low back pain     Scoliosis     Vertigo        No family history on file. Current Outpatient Medications   Medication Sig Dispense Refill    apixaban (ELIQUIS) 5 mg tablet Take 1 Tab by mouth two (2) times a day. 60 Tab 3    dilTIAZem (TIAZAC) 120 mg SR capsule Take 120 mg by mouth daily.  traZODone (DESYREL) 100 mg tablet Take 100 mg by mouth nightly.  BUPROPION HCL PO Take 300 mg by mouth.  pseudoephedrine (SUDAFED) 60 mg tablet Take  by mouth every four (4) hours as needed for Congestion.  traMADol (ULTRAM) 50 mg tablet Take 50 mg by mouth every six (6) hours as needed for Pain.  clonazePAM (KLONOPIN) 1 mg tablet Take  by mouth three (3) times daily.  Omeprazole delayed release (PRILOSEC D/R) 20 mg tablet Take 20 mg by mouth. Take two caps by mouth twice a day      diphenhydrAMINE (BENADRYL) 25 mg capsule Take 25 mg by mouth every six (6) hours as needed.            Allergies   Allergen Reactions    Latex, Natural Rubber Other (comments)    Cephalexin Other (comments)    Darvon [Propoxyphene] Nausea and Vomiting    Shellfish Containing Products Unknown (comments)       Past Surgical History:   Procedure Laterality Date    HX BACK SURGERY  06/03/1999    HX BACK SURGERY  05/22/2015    HX HEENT      Nasal    HX KNEE ARTHROSCOPY      Rt knee    HX ORTHOPAEDIC      HX ORTHOPAEDIC      Lt ankle    HX SHOULDER ARTHROSCOPY      Rt shoulder    UT COMPRE ELECTROPHYSIOL XM W/LEFT ATRIAL PACNG/REC N/A 10/8/2019    Lt Atrial Pace & Record During Ep Study performed by Tracy Torres MD at [facility] CATH LAB    MD LESLIE CORTES W/ABLATION [address] Right 10/8/2019    ABLATION A-FLUTTER/MARKUS with anesthesia prior performed by Tracy Torres MD at [address] [place] [address]       Past Medical History:   Diagnosis Date    Atrial flutter ([address]     S/P Flutter ablation (09/19)    Colon disorder     Spastic    Erectile dysfunction     Gastric ulcer     Herniated disc     Hypertension     Low back pain     Scoliosis     Vertigo        Social History     Socioeconomic History    Marital status:      Spouse name: Not on file    Number of children: Not on file    Years of education: Not on file    Highest education level: Not on file   Occupational History    Not on file   Social Needs    Financial resource strain: Not on file    Food insecurity:     Worry: Not on file     Inability: Not on file    Transportation needs:     Medical: Not on file     Non-medical: Not on file   Tobacco Use    Smoking status: Never Smoker    Smokeless tobacco: Never Used   Substance and Sexual Activity    Alcohol use: Yes     Comment: rare    Drug use: No    Sexual activity: Not on file   Lifestyle    Physical activity:     Days per week: Not on file     Minutes per session: Not on file    Stress: Not on file   Relationships    Social connections:     Talks on phone: Not on file     Gets together: Not on file     Attends Orthodoxy service: Not on file     Active member of club or organization: Not on file     Attends meetings of clubs or organizations: Not on file     Relationship status: Not on file    Intimate partner violence:     Fear of current or ex partner: Not on file     Emotionally abused: Not on file     Physically abused: Not on file     Forced sexual activity: Not on file   Other Topics Concern    Not on file   Social History Narrative    Not on file         REVIEW OF SYSTEMS:   CONSTITUTIONAL SYMPTOMS: Negative. EYES:  Negative. EARS, NOSE, THROAT AND MOUTH:  Negative. CARDIOVASCULAR:  Negative. RESPIRATORY:  Negative. GENITOURINARY: Per HPI. GASTROINTESTINAL:  Per HPI. INTEGUMENTARY (SKIN AND/OR BREAST):  Negative. MUSCULOSKELETAL: Per HPI.   ENDOCRINE/RHEUMATOLOGIC:  Negative. NEUROLOGICAL:  Per HPI. HEMATOLOGIC/LYMPHATIC:  Negative. ALLERGIC/IMMUNOLOGIC:  Negative. PSYCHIATRIC:  Negative. PHYSICAL EXAMINATION:   Visit Vitals  /72 (BP 1 Location: Right arm, BP Patient Position: Sitting)   Pulse 83   Temp 98.3 °F (36.8 °C) (Oral)   Resp 20   Ht 5' 10\" (1.778 m)   Wt 231 lb 12.8 oz (105.1 kg)   SpO2 95%   BMI 33.26 kg/m²    PAIN SCALE: 10 - Worst pain ever/10    CONSTITUTIONAL: The patient is in no apparent distress and is alert and oriented x 3. HEENT: Normocephalic. Hearing grossly intact. NECK: Supple and symmetric. no tenderness, or masses were felt. RESPIRATORY: No labored breathing. CARDIOVASCULAR: The carotid pulses were normal. Peripheral pulses were 2+. CHEST: Normal AP diameter and normal contour without any kyphoscoliosis. LYMPHATIC: No lymphadenopathy was appreciated in the neck, axillae or groin. SKIN:   Negative for scars, rashes, lesions, or ulcers on the right upper, right lower, left upper, left lower and trunk. NEUROLOGICAL: Alert and oriented x 3. Ambulation with single point cane. FWB. EXTREMITIES: See musculoskeletal.  MUSCULOSKELETAL:   Head and Neck:  Negative for misalignment, asymmetry, crepitation, defects, tenderness masses or effusions.  Left Upper Extremity: Inspection, percussion and palpation performed. Angeless sign is negative.  Right Upper Extremity: Inspection, percussion and palpation performed. Angeless sign is negative.  Spine, Ribs and Pelvis: Back pain. Inspection, percussion and palpation performed. Negative for misalignment, asymmetry, crepitation, defects, tenderness masses or effusions.     Left Lower Extremity: N/T. Inspection, percussion and palpation performed. Negative straight leg raise.  Right Lower Extremity: Inspection, percussion and palpation performed. Negative straight leg raise. SPINE EXAM:     Lumbar spine: No rash, ecchymosis, or gross obliquity. No focal atrophy is noted. ASSESSMENT    ICD-10-CM ICD-9-CM    1. Degenerative scoliosis M41.50 737.30    2. Foraminal stenosis of lumbar region M48.061 724.02        Written by Holli Adkins, as dictated by Alex Donato MD.    I, Dr. Alex Donato MD, confirm that all documentation is accurate.

## 2020-02-14 ENCOUNTER — TELEPHONE (OUTPATIENT)
Dept: ORTHOPEDIC SURGERY | Age: 70
End: 2020-02-14

## 2020-02-14 NOTE — TELEPHONE ENCOUNTER
Referral to Dr. Lex Pierson @ [facility] from his office will call him to set up appt.

## 2020-02-14 NOTE — TELEPHONE ENCOUNTER
I called and spoke to Mr. Ivan Lynch about his message. The pt was identified using 2 pt identifiers. The pt wanted to let someone at the office know that he has not heard anything from [facility] about the referral that Dr. Tyrone Pierson ordered for him. A chart review was done and the referral from 01/14/2020 was to Dr. Tam Sethi at [facility]. In the referral notes, the referral coordinator documented that on 01/29/2020 that the pt had not yet had his imaging done yet and was not sure if he wanted to go to [facility]. Then on 02/14/2020 he spoke with her again and told her that he does not have a way to get there but will call back if something changes. Mr. Ivan Lynch was asked if he remembered speaking to Paola Curry, the referral coordinator at 1020 this morning and having this conversation. He confirmed that he did but did not remember saying that he would not have a way to get there. It was confirmed with the pt that he will have a way to get to [facility] if he is scheduled for an appt. Pt made aware that his message will be forwarded to the coordinator so that we can get this referral out for him. He verbalized understanding and has no questions at this time.

## 2020-02-14 NOTE — TELEPHONE ENCOUNTER
Patient called and asked if a nurse could give him a call, patient refused to state his reason for calling. He stated that it is a personal matter.          Patient tel: [phone]

## 2020-02-24 DIAGNOSIS — M48.061 FORAMINAL STENOSIS OF LUMBAR REGION: ICD-10-CM

## 2020-02-24 DIAGNOSIS — M41.50 DEGENERATIVE SCOLIOSIS: ICD-10-CM

## 2020-06-30 ENCOUNTER — TELEPHONE (OUTPATIENT)
Dept: ORTHOPEDIC SURGERY | Age: 70
End: 2020-06-30
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
## 2020-06-30 NOTE — TELEPHONE ENCOUNTER
Per Dr. Angle Murillo note, . Such sense of deformity correction I would leave to the judgement of the team at [facility].   I have already made the referral    I would recommend he FU with [facility] as discussed with Dr. Angle Murillo

## 2020-07-08 NOTE — TELEPHONE ENCOUNTER
Patient returned call. I relayed the previous note. He states that was what he was told a couple weeks ago and the call yesterday must pertain to something else. I explained that the nurse attempted a call and this was the information she was trying to relay. This is not an acceptable answer and he requesting to speak with the nurse.

## 2020-07-08 NOTE — TELEPHONE ENCOUNTER
PT Called again and is requesting a call back from [address] nurse.     Patient tel. [phone]
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
## 2020-07-30 ENCOUNTER — OFFICE VISIT (OUTPATIENT)
Dept: CARDIOLOGY CLINIC | Age: 70
End: 2020-07-30

## 2020-07-30 VITALS
DIASTOLIC BLOOD PRESSURE: 82 MMHG | OXYGEN SATURATION: 95 % | HEART RATE: 108 BPM | SYSTOLIC BLOOD PRESSURE: 126 MMHG | WEIGHT: 237 LBS | HEIGHT: 70 IN | BODY MASS INDEX: 33.93 KG/M2

## 2020-07-30 DIAGNOSIS — Z79.01 ANTICOAGULATED: ICD-10-CM

## 2020-07-30 DIAGNOSIS — Z01.810 PREOP CARDIOVASCULAR EXAM: ICD-10-CM

## 2020-07-30 DIAGNOSIS — Z98.890 H/O CARDIAC RADIOFREQUENCY ABLATION: Primary | ICD-10-CM

## 2020-07-30 DIAGNOSIS — I49.9 IRREGULAR HEART BEAT: ICD-10-CM

## 2020-07-30 DIAGNOSIS — I48.92 ATRIAL FLUTTER, UNSPECIFIED TYPE (HCC): ICD-10-CM

## 2020-07-30 RX ORDER — LOPERAMIDE HCL 2 MG
2 TABLET ORAL
COMMUNITY
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
## 2020-07-30 NOTE — PROGRESS NOTES
Silviomaranda Bautista presents today for   Chief Complaint   Patient presents with    Follow-up     6 month follow up       Lefty Bautista preferred language for health care discussion is english/other. Is someone accompanying this pt? no    Is the patient using any DME equipment during [address]? [place]    Depression Screening:  3 most recent PHQ Screens 7/30/2020   Little interest or pleasure in doing things Not at all   Feeling down, depressed, irritable, or hopeless Not at all   Total Score PHQ 2 0       Learning Assessment:  Learning Assessment 1/30/2020   PRIMARY LEARNER Patient   PRIMARY LANGUAGE ENGLISH   LEARNER PREFERENCE PRIMARY DEMONSTRATION   ANSWERED BY Patient   RELATIONSHIP SELF       Abuse Screening:  Abuse Screening Questionnaire 7/30/2020   Do you ever feel afraid of your partner? N   Are you in a relationship with someone who physically or mentally threatens you? N   Is it safe for you to go home? Y       Fall Risk  Fall Risk Assessment, last 12 mths 7/30/2020   Able to walk? Yes   Fall in past 12 months? No   Fall with injury? -   Number of falls in past 12 months -   Fall Risk Score -       Pt currently taking Anticoagulant therapy? Eliquis 5 mg twice a day    Coordination of Care:  1. Have you been to the ER, urgent care clinic since your last visit? Hospitalized since your last visit? no    2. Have you seen or consulted any other health care providers outside of the [address] [place] [address] since your last visit? Include any pap smears or colon screening.  no

## 2020-09-09 ENCOUNTER — OFFICE VISIT (OUTPATIENT)
Dept: CARDIOLOGY CLINIC | Age: 70
End: 2020-09-09

## 2020-09-09 VITALS
HEART RATE: 81 BPM | HEIGHT: 70 IN | DIASTOLIC BLOOD PRESSURE: 84 MMHG | OXYGEN SATURATION: 95 % | WEIGHT: 238 LBS | SYSTOLIC BLOOD PRESSURE: 132 MMHG | BODY MASS INDEX: 34.07 KG/M2

## 2020-09-09 DIAGNOSIS — I49.9 IRREGULAR HEART BEAT: ICD-10-CM

## 2020-09-09 DIAGNOSIS — Z79.01 ANTICOAGULATED: ICD-10-CM

## 2020-09-09 DIAGNOSIS — I48.92 ATRIAL FLUTTER, UNSPECIFIED TYPE (HCC): ICD-10-CM

## 2020-09-09 DIAGNOSIS — Z98.890 H/O CARDIAC RADIOFREQUENCY ABLATION: ICD-10-CM

## 2020-09-09 DIAGNOSIS — Z01.810 PREOP CARDIOVASCULAR EXAM: Primary | ICD-10-CM

## 2020-09-09 RX ORDER — TADALAFIL 5 MG/1
5 TABLET ORAL
COMMUNITY
End: 2021-03-05

## 2020-09-09 NOTE — PROGRESS NOTES
Meaghan Jeffers presents today for   Chief Complaint   Patient presents with    Surgical Clearance     for Hip Joint with Dr. Jeffrey Renteria preferred language for health care discussion is english/other. Is someone accompanying this pt? no    Is the patient using any DME equipment during [address]? no    Depression Screening:  3 most recent PHQ Screens 9/9/2020   Little interest or pleasure in doing things Not at all   Feeling down, depressed, irritable, or hopeless Not at all   Total Score PHQ 2 0       Learning Assessment:  Learning Assessment 9/9/2020   PRIMARY LEARNER Patient   PRIMARY LANGUAGE ENGLISH   LEARNER PREFERENCE PRIMARY DEMONSTRATION   ANSWERED BY patient   RELATIONSHIP SELF       Abuse Screening:  Abuse Screening Questionnaire 9/9/2020   Do you ever feel afraid of your partner? N   Are you in a relationship with someone who physically or mentally threatens you? N   Is it safe for you to go home? Y       Fall Risk  Fall Risk Assessment, last 12 mths 9/9/2020   Able to walk? Yes   Fall in past 12 months? No   Fall with injury? -   Number of falls in past 12 months -   Fall Risk Score -       Pt currently taking Anticoagulant therapy? Eliquis 5 mg twice a day    Coordination of Care:  1. Have you been to the ER, urgent care clinic since your last visit? Hospitalized since your last visit? no    2. Have you seen or consulted any other health care providers outside of the [address] [place] [address] since your last visit? Include any pap smears or colon screening.  no

## 2020-09-09 NOTE — PROGRESS NOTES
HPI: A 23-year-old male here for follow up. I last saw him 7/30/20. He was planning to be reevaluated for possible spinal surgery at [facility] but since then, this has been deferred and they have decided to pursue hip joint replacement with Dr. Lisbet Lott on the left for which he is scheduled to see him in October. He tells me that his functional status is more limited from his hip than his back. When I saw him earlier due to the perceived urgency of the spine surgery and in ability to give antiplatelets for nearly a year, I recommended we go ahead and proceed. His functional status is still quite poor with difficulty ambulating. Impression/Plan:  1. Preoperative cardiovascular exam for possible left hip surgery by Dr. Lisbet Lott. 2. Previous plans for spine surgery at [facility] but deferred with plans for hip surgery first.   3. History of atrial flutter with ablation October 2019 on diltiazem and Eliquis. 4. 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 5. Chronic back pain with limited functional status. 6. History of hypertension controlled. 7. Scoliosis. 8. Remote gastric ulcer. 9. Shellfish allergy. I am going to repeat an echocardiogram to make sure his left ventricular function is unchanged. If it is not normal, I recommended we proceed to pharmacological cardiac nuclear stress test for further risk stratification. If everything is stable, I will plan to see him back in six months. All questions answered. Past Medical History:   Diagnosis Date    Atrial flutter (HCC)     S/P Flutter ablation (09/19)    Colon disorder     Spastic    Erectile dysfunction     Gastric ulcer     Herniated disc     Hypertension     Low back pain     Scoliosis     Vertigo        Current Outpatient Medications   Medication Sig Dispense Refill    tadalafiL (Cialis) 5 mg tablet Take 5 mg by mouth.       loperamide (IMMODIUM) 2 mg tablet Take 2 mg by mouth.  apixaban (ELIQUIS) 5 mg tablet Take 1 Tab by mouth two (2) times a day. 60 Tab 3    dilTIAZem (TIAZAC) 120 mg SR capsule Take 120 mg by mouth daily.  traZODone (DESYREL) 100 mg tablet Take 100 mg by mouth nightly.  BUPROPION HCL PO Take 300 mg by mouth.  pseudoephedrine (SUDAFED) 60 mg tablet Take  by mouth every four (4) hours as needed for Congestion.  traMADol (ULTRAM) 50 mg tablet Take 50 mg by mouth every six (6) hours as needed for Pain.  clonazePAM (KLONOPIN) 1 mg tablet Take  by mouth three (3) times daily.  Omeprazole delayed release (PRILOSEC D/R) 20 mg tablet Take 20 mg by mouth. Take two caps by mouth twice a day      diphenhydrAMINE (BENADRYL) 25 mg capsule Take 25 mg by mouth every six (6) hours as needed. Social History   reports that he has never smoked. He has never used smokeless tobacco.   reports current alcohol use. Family History  family history is not on file. Review of Systems  Except as stated above include:  Constitutional: Negative for fever, chills and malaise/fatigue. HEENT: No congestion or recent URI. Gastrointestinal: No nausea, vomiting, abdominal pain, bloody stools. Pulmonary:  Negative except as stated above. Cardiac:  Negative except as stated above. Musculoskeletal: Negative except as stated above. Neurological:  No localized symptoms. Skin:  Negative except as stated above. Psych:  Negative except as stated above. Endocrine:  Negative except as stated above. PHYSICAL EXAM  BP Readings from Last 3 Encounters:   09/09/20 132/84   07/30/20 126/82   02/04/20 117/72     Pulse Readings from Last 3 Encounters:   09/09/20 81   07/30/20 (!) 108   02/04/20 83     Wt Readings from Last 3 Encounters:   09/09/20 108 kg (238 lb)   07/30/20 107.5 kg (237 lb)   02/04/20 105.1 kg (231 lb 12.8 oz)     General:   Well developed, well groomed. Head/Neck:   No jugular venous distention     No carotid bruits.     No evidence of xanthelasma. Lungs:   No respiratory distress. Clear bilaterally. Heart:    Regular rate and rhythm. Normal S1/S2. Palpation of heart with normal point of maximum impulse. No significant murmurs, rubs or gallops. Abdomen:   Soft and nontender. No palpable abdominal mass or bruits. Extremities:   Intact peripheral pulses. No significant edema. Neurological:   Alert and oriented to person, place, time. No focal neurological deficit visually. Skin:   No obvious rash    Blood Pressure Metric:  Monitor recommended and adjustments stated if needed.

## 2020-09-17 ENCOUNTER — OFFICE VISIT (OUTPATIENT)
Dept: ORTHOPEDIC SURGERY | Age: 70
End: 2020-09-17

## 2020-09-17 VITALS
HEART RATE: 70 BPM | DIASTOLIC BLOOD PRESSURE: 79 MMHG | TEMPERATURE: 97.3 F | OXYGEN SATURATION: 95 % | SYSTOLIC BLOOD PRESSURE: 146 MMHG | WEIGHT: 234.8 LBS | HEIGHT: 70 IN | BODY MASS INDEX: 33.61 KG/M2

## 2020-09-17 DIAGNOSIS — M25.552 LEFT HIP PAIN: Primary | ICD-10-CM

## 2020-09-17 DIAGNOSIS — M54.50 LUMBAR PAIN: ICD-10-CM

## 2020-09-17 NOTE — PROGRESS NOTES
Patient: Shanda Hicks                MRN: [id number]       SSN: [id number]  YOB: 1950        AGE: 72 y.o. SEX: male  Body mass index is 33.69 kg/m². PCP: Jessika Isaacs MD  09/17/20    HISTORY:  I had the pleasure of reviewing Mr. eDvorah Mandujano. It has been about a year or so. He has been having progressive back problems and apparently he is due to have a myelogram.  He is going to be possibly having surgery from [facility]. He is following up with re-evaluation of left hip pain. It is moderate. Most days it is mild. His back drives him much more crazy than that. He has some known scoliosis apparently and apparently some radiculopathy going down the legs. He denies fevers or chills and otherwise has been feeling well. He states that his arthritic symptoms from his left hip has not really worsened too much from the last time I saw him. He has a shoe lift fitted on the right side and he feels that it has helped considerably with his left hip pain. PHYSICAL EXAMINATION:  Today:  He is a little bit kyphotic and a little scoliotic when he ambulates. No major antalgic component to the gait. Minimal Trendelenburg gait owing to the left side. The right hip rotates well. The left hip is somewhat stiff with only about 10 degrees of rotation, but it really does not hurt him too much with flexion and internal rotation. Not much tenderness over the trochanter. The low back is more tender, and he has evidence of neuropathy distally. I think the right leg is a touch stronger than the left currently. There is no foot drop. Some mild varicosities distally and calf nontender. Homans sign negative. RADIOGRAPHS:  Today on 09/17/2020 including AP pelvis, AP and lateral of the left hip shows that he has advanced-to-severe arthritis of the left hip with some osteophyte formation, but still with a little bit of cartilage remaining.       OVERALL IMPRESSION:  My overall impression is advancing arthritis, but not all that symptomatic. PLAN:  We did discuss intra-articular injection. He would like to hold off on that for the time being. I would recommend a follow-up in about 6 months or less for repeat x-ray. I have explained that arthritis goes at different rates. His back is sore enough right now that he is not too worried about the hip. We could consider an intra-articular injection if he becomes more symptomatic and eventually a hip replacement on that left side. I will follow along with him and it has been a pleasure to share in his care. CC: Jennie Rodriguez MD (sp)        REVIEW OF SYSTEMS:      CON: negative  EYE: negative   ENT: negative  RESP: negative  GI:    negative   :  negative  MSK: Positive  A twelve point review of systems was completed, positives noted and all other systems were reviewed and are negative          Past Medical History:   Diagnosis Date    Atrial flutter (HCC)     S/P Flutter ablation (09/19)    Colon disorder     Spastic    Erectile dysfunction     Gastric ulcer     Herniated disc     Hypertension     Low back pain     Scoliosis     Vertigo        History reviewed. No pertinent family history. Current Outpatient Medications   Medication Sig Dispense Refill    tadalafiL (Cialis) 5 mg tablet Take 5 mg by mouth.  loperamide (IMMODIUM) 2 mg tablet Take 2 mg by mouth.  apixaban (ELIQUIS) 5 mg tablet Take 1 Tab by mouth two (2) times a day. 60 Tab 3    dilTIAZem (TIAZAC) 120 mg SR capsule Take 120 mg by mouth daily.  traZODone (DESYREL) 100 mg tablet Take 100 mg by mouth nightly.  BUPROPION HCL PO Take 300 mg by mouth.  pseudoephedrine (SUDAFED) 60 mg tablet Take  by mouth every four (4) hours as needed for Congestion.  traMADol (ULTRAM) 50 mg tablet Take 50 mg by mouth every six (6) hours as needed for Pain.  clonazePAM (KLONOPIN) 1 mg tablet Take  by mouth three (3) times daily.         Omeprazole delayed release (PRILOSEC D/R) 20 mg tablet Take 20 mg by mouth. Take two caps by mouth twice a day      diphenhydrAMINE (BENADRYL) 25 mg capsule Take 25 mg by mouth every six (6) hours as needed.            Allergies   Allergen Reactions    Latex, Natural Rubber Other (comments)    Cephalexin Other (comments)    Darvon [Propoxyphene] Nausea and Vomiting    Shellfish Containing Products Unknown (comments)       Past Surgical History:   Procedure Laterality Date    HX BACK SURGERY  06/03/1999    HX BACK SURGERY  05/22/2015    HX HEENT      Nasal    HX KNEE ARTHROSCOPY      Rt knee    HX ORTHOPAEDIC      HX ORTHOPAEDIC      Lt ankle    HX SHOULDER ARTHROSCOPY      Rt shoulder    CA COMPRE ELECTROPHYSIOL XM W/LEFT ATRIAL PACNG/REC N/A 10/8/2019    Lt Atrial Pace & Record During Ep Study performed by Vickey Schlatter, MD at [facility]    CA EPHYS EVAL W/ABLATION [address] Right 10/8/2019    ABLATION A-FLUTTER/MARKUS with anesthesia prior performed by Vickey Schlatter, MD at [facility]       Social History     Socioeconomic History    Marital status:      Spouse name: Not on file    Number of children: Not on file    Years of education: Not on file    Highest education level: Not on file   Occupational History    Not on file   Social Needs    Financial resource strain: Not on file    Food insecurity     Worry: Not on file     Inability: Not on file    Transportation needs     Medical: Not on file     Non-medical: Not on file   Tobacco Use    Smoking status: Never Smoker    Smokeless tobacco: Never Used   Substance and Sexual Activity    Alcohol use: Yes     Comment: rare    Drug use: No    Sexual activity: Not on file   Lifestyle    Physical activity     Days per week: Not on file     Minutes per session: Not on file    Stress: Not on file   Relationships    Social connections     Talks on phone: Not on file     Gets together: Not on file     Attends Christian service: Not on file     Active member of club or organization: Not on file     Attends meetings of clubs or organizations: Not on file     Relationship status: Not on file    Intimate partner violence     Fear of current or ex partner: Not on file     Emotionally abused: Not on file     Physically abused: Not on file     Forced sexual activity: Not on file   Other Topics Concern    Not on file   Social History Narrative    Not on file       Visit Vitals  BP (!) 146/79 (BP 1 Location: Right arm, BP Patient Position: Sitting)   Pulse 70   Temp 97.3 °F (36.3 °C) (Temporal)   Ht 5' 10\" (1.778 m)   Wt 234 lb 12.8 oz (106.5 kg)   SpO2 95%   BMI 33.69 kg/m²         PHYSICAL EXAMINATION:  GENERAL: Alert and oriented x3, in no acute distress, well-developed, well-nourished, afebrile. HEART: No JVD. EYES: No scleral icterus   NECK: No significant lymphadenopathy   LUNGS: No respiratory compromise or indrawing  ABDOMEN: Soft, non-tender, non-distended. Electronically signed by:  Cely Tobar MD

## 2020-10-09 ENCOUNTER — HOSPITAL ENCOUNTER (OUTPATIENT)
Dept: GENERAL RADIOLOGY | Age: 70
Discharge: HOME | End: 2020-10-09
Attending: RADIOLOGY | Admitting: RADIOLOGY

## 2020-10-09 ENCOUNTER — TELEPHONE (OUTPATIENT)
Dept: CARDIOLOGY CLINIC | Age: 70
End: 2020-10-09

## 2020-10-09 ENCOUNTER — APPOINTMENT (OUTPATIENT)
Dept: CT IMAGING | Age: 70
End: 2020-10-09

## 2020-10-09 DIAGNOSIS — M48.061 SPINAL STENOSIS, LUMBAR REGION, WITHOUT NEUROGENIC CLAUDICATION: ICD-10-CM

## 2020-10-09 DIAGNOSIS — M48.02 SPINAL STENOSIS IN CERVICAL REGION: ICD-10-CM

## 2020-10-09 DIAGNOSIS — M48.04 SPINAL STENOSIS OF THORACIC REGION: ICD-10-CM

## 2020-10-09 RX ORDER — SODIUM CHLORIDE 9 MG/ML
20 INJECTION, SOLUTION INTRAVENOUS CONTINUOUS
Status: DISCONTINUED | OUTPATIENT
Start: 2020-10-09 | End: 2020-10-16 | Stop reason: HOSPADM

## 2020-10-09 NOTE — DISCHARGE INSTRUCTIONS
DISCHARGE SUMMARY from Nurse            PATIENT INSTRUCTIONS:      , Please notify Physician Dr. Gabriella Hernandez that's following Eliquis. Patient need to stop Eliquis for five days prior to procedure, please notify [facility] Radiology Department To Reschedule  Procedure. These are general instructions for a healthy lifestyle:    No smoking/ No tobacco products/ Avoid exposure to second hand smoke    Surgeon General's Warning:  Quitting smoking now greatly reduces serious risk to your health. Obesity, smoking, and sedentary lifestyle greatly increases your risk for illness    A healthy diet, regular physical exercise & weight monitoring are important for maintaining a healthy lifestyle    You may be retaining fluid if you have a history of heart failure or if you experience any of the following symptoms:  Weight gain of 3 pounds or more overnight or 5 pounds in a week, increased swelling in our hands or feet or shortness of breath while lying flat in bed. Please call your doctor as soon as you notice any of these symptoms; do not wait until your next office visit. Recognize signs and symptoms of STROKE:    F-face looks uneven    A-arms unable to move or move unevenly    S-speech slurred or non-existent    T-time-call 911 as soon as signs and symptoms begin-DO NOT go       Back to bed or wait to see if you get better-TIME IS BRAIN. Warning Signs of HEART ATTACK     Call 911 if you have these symptoms:   Chest discomfort. Most heart attacks involve discomfort in the center of the chest that lasts more than a few minutes, or that goes away and comes back. It can feel like uncomfortable pressure, squeezing, fullness, or pain.  Discomfort in other areas of the upper body. Symptoms can include pain or discomfort in one or both arms, the back, neck, jaw, or stomach.  Shortness of breath with or without chest discomfort.    Other signs may include breaking out in a cold sweat, nausea, or lightheadedness. Don't wait more than five minutes to call 911 - MINUTES MATTER! Fast action can save your life. Calling 911 is almost always the fastest way to get lifesaving treatment. Emergency Medical Services staff can begin treatment when they arrive -- up to an hour sooner than if someone gets to the hospital by car. The discharge information has been reviewed with the {PATIENT PARENT GUARDIAN:[id number]}. The {PATIENT PARENT GUARDIAN:23220} verbalized understanding.     Patient armband removed and shredded

## 2020-10-09 NOTE — TELEPHONE ENCOUNTER
Patient called to say he could not do a myelogram due to him being on eliquis. Dr Pat Magana in [facility] needs to test done. [facility] not do the myel[facility] , needs the patient off eliquis 5 days prior to procedure.     [facility] radiology [phone]     I will forward this to Dr Reina Garces

## 2020-10-16 ENCOUNTER — HOSPITAL ENCOUNTER (OUTPATIENT)
Dept: GENERAL RADIOLOGY | Age: 70
Discharge: HOME | End: 2020-10-16
Attending: RADIOLOGY | Admitting: RADIOLOGY
Payer: MEDICARE

## 2020-10-16 ENCOUNTER — HOSPITAL ENCOUNTER (OUTPATIENT)
Dept: CT IMAGING | Age: 70
Discharge: HOME | End: 2020-10-16
Payer: MEDICARE

## 2020-10-16 VITALS
HEART RATE: 76 BPM | WEIGHT: 237 LBS | BODY MASS INDEX: 33.18 KG/M2 | SYSTOLIC BLOOD PRESSURE: 111 MMHG | OXYGEN SATURATION: 97 % | RESPIRATION RATE: 18 BRPM | DIASTOLIC BLOOD PRESSURE: 74 MMHG | HEIGHT: 71 IN | TEMPERATURE: 96.8 F

## 2020-10-16 LAB
ANION GAP SERPL CALC-SCNC: 4 MMOL/L (ref 3–18)
APTT PPP: 31.6 SEC (ref 23–36.4)
BUN SERPL-MCNC: 13 MG/DL (ref 7–18)
BUN/CREAT SERPL: 14 (ref 12–20)
CALCIUM SERPL-MCNC: 9.2 MG/DL (ref 8.5–10.1)
CHLORIDE SERPL-SCNC: 109 MMOL/L (ref 100–111)
CO2 SERPL-SCNC: 29 MMOL/L (ref 21–32)
CREAT SERPL-MCNC: 0.96 MG/DL (ref 0.6–1.3)
ERYTHROCYTE [DISTWIDTH] IN BLOOD BY AUTOMATED COUNT: 12.7 % (ref 11.6–14.5)
GLUCOSE SERPL-MCNC: 93 MG/DL (ref 74–99)
HCT VFR BLD AUTO: 40.3 % (ref 36–48)
HGB BLD-MCNC: 13.5 G/DL (ref 13–16)
INR PPP: 1.2 (ref 0.8–1.2)
MCH RBC QN AUTO: 31.5 PG (ref 24–34)
MCHC RBC AUTO-ENTMCNC: 33.5 G/DL (ref 31–37)
MCV RBC AUTO: 93.9 FL (ref 74–97)
PLATELET # BLD AUTO: 214 K/UL (ref 135–420)
PMV BLD AUTO: 11.5 FL (ref 9.2–11.8)
POTASSIUM SERPL-SCNC: 3.3 MMOL/L (ref 3.5–5.5)
PROTHROMBIN TIME: 15.3 SEC (ref 11.5–15.2)
RBC # BLD AUTO: 4.29 M/UL (ref 4.7–5.5)
SODIUM SERPL-SCNC: 142 MMOL/L (ref 136–145)
WBC # BLD AUTO: 6.4 K/UL (ref 4.6–13.2)

## 2020-10-16 PROCEDURE — 72129 CT CHEST SPINE W/DYE: CPT

## 2020-10-16 PROCEDURE — 72126 CT NECK SPINE W/DYE: CPT

## 2020-10-16 PROCEDURE — 80048 BASIC METABOLIC PNL TOTAL CA: CPT

## 2020-10-16 PROCEDURE — 74011250636 HC RX REV CODE- 250/636: Performed by: RADIOLOGY

## 2020-10-16 PROCEDURE — 72132 CT LUMBAR SPINE W/DYE: CPT

## 2020-10-16 PROCEDURE — 74011000250 HC RX REV CODE- 250: Performed by: RADIOLOGY

## 2020-10-16 PROCEDURE — 74011000636 HC RX REV CODE- 636: Performed by: RADIOLOGY

## 2020-10-16 PROCEDURE — 62305 MYELOGRAPHY LUMBAR INJECTION: CPT

## 2020-10-16 PROCEDURE — 85027 COMPLETE CBC AUTOMATED: CPT

## 2020-10-16 PROCEDURE — 85610 PROTHROMBIN TIME: CPT

## 2020-10-16 PROCEDURE — 85730 THROMBOPLASTIN TIME PARTIAL: CPT

## 2020-10-16 RX ORDER — SODIUM CHLORIDE 9 MG/ML
20 INJECTION, SOLUTION INTRAVENOUS CONTINUOUS
Status: DISCONTINUED | OUTPATIENT
Start: 2020-10-16 | End: 2020-10-19 | Stop reason: HOSPADM

## 2020-10-16 RX ORDER — LIDOCAINE HYDROCHLORIDE 10 MG/ML
30 INJECTION, SOLUTION EPIDURAL; INFILTRATION; INTRACAUDAL; PERINEURAL ONCE
Status: COMPLETED | OUTPATIENT
Start: 2020-10-16 | End: 2020-10-16

## 2020-10-16 RX ORDER — ACETAMINOPHEN 325 MG/1
650 TABLET ORAL ONCE
Status: DISCONTINUED | OUTPATIENT
Start: 2020-10-16 | End: 2020-10-17 | Stop reason: HOSPADM

## 2020-10-16 RX ADMIN — SODIUM CHLORIDE 20 ML/HR: 900 INJECTION, SOLUTION INTRAVENOUS at 12:28

## 2020-10-16 RX ADMIN — IOPAMIDOL 15 ML: 612 INJECTION, SOLUTION INTRATHECAL at 14:15

## 2020-10-16 RX ADMIN — LIDOCAINE HYDROCHLORIDE 10 ML: 10 INJECTION, SOLUTION EPIDURAL; INFILTRATION; INTRACAUDAL; PERINEURAL at 14:15

## 2020-10-16 NOTE — PROCEDURES
Interventional Radiology Brief Procedure Note    Patient: Tanesha Vickers MRN: [id number]  SSN: [id number]    YOB: 1950  Age: 72 y.o. Sex: male      Date of Procedure: 10/16/2020    Procedure(s): Cervical, Thoracic and Lumbar Myelogram    Performed By: ELVA Salas    Anesthesia: Lidocaine    Estimated Blood Loss: None    Specimens: None    Findings:     - Written and verbal informed consent was obtained. - Time Out was performed. - Permanent image storage performed on PACS. - Image-guided cervical, thoracic and lumbar myelogram performed using maximum barrier precautions and sterile technique. - Please refer to report on PACS for full details. Implants: None    Plan: Patient to lay flat for three hours with bathroom privileges after two hours. Follow Up:  With Dr. Spencer Curtis    Signed By: Angie Salas     October 16, 2020

## 2020-10-16 NOTE — PERIOP NOTES
Left antecubital s.l. d/c with catheter tip intact. Patient in wheelchair and he was assisted with getting dressed with my help.

## 2020-10-16 NOTE — DISCHARGE INSTRUCTIONS
Patient Education   Myelogram: About This Test  What is it? A myelogram uses X-rays and a special dye to make pictures of bones and nerves of the spine (spinal canal). The spinal canal holds the spinal cord, the spinal nerve roots, and the fluid-filled space between the bones in your spine. Why is this test done? A myelogram is done to check for:  · The cause of arm or leg numbness, weakness, or pain. · Narrowing of the spinal canal (spinal stenosis). · A tumor or infection causing problems with the spinal cord or nerve roots. · A spinal disc that has ruptured (herniated disc). · Inflammation of the membrane that covers the brain and spinal cord. · Problems with the blood vessels to the spine. This test may help find the cause of pain that can't be found by other tests, such as an MRI or a CT scan. How do you prepare for the test?  Your doctor will tell you if you need to change how much you eat and drink before the myelogram. You may be asked to increase the amount of water you drink before the test. Follow the instructions your doctor gives you about eating and drinking. If you take aspirin or some other blood thinner, ask your doctor if you should stop taking it before your test. Make sure that you understand exactly what your doctor wants you to do. These medicines increase the risk of bleeding. Be sure you have someone to take you home. Anesthesia and pain medicine will make it unsafe for you to drive or get home on your own. How is the test done? The dye is put into your spinal canal with a thin needle. This is called a lumbar puncture. The dye moves through the space so the nerve roots and spinal cord can be seen more clearly. After the dye is put in, you will lie still while the X-ray pictures are taken. How does it feel? You will feel a quick sting from a small needle that has medicine to numb the skin on your back.  You will also feel some pressure as the long, thin spinal needle is put into your spinal canal. You may feel a quick, sharp pain down your buttock or leg when the needle is moved in your spine. You may find it hard to lie on your stomach or side during this test.  The dye may make you feel warm and flushed and have a metallic taste in your mouth. Some people feel sick to their stomach or have a headache. Tell your doctor how you are feeling. How long does the test take? · A myelogram usually takes 30 minutes to 1 hour. What happens after the test?  · You will probably be able to go home 30 minutes to 2 hours after the test.  · You may need to lie in bed with your head raised for 4 to 24 hours after the test. To prevent seizures, which are a rare side effect, do not bend over or lie down with your head lower than your body. Keeping your head higher than your body after a myelogram also may help prevent or reduce other side effects, such as headache, nausea, or vomiting. · Avoid strenuous activity, such as running or heavy lifting, for at least 1 day after your myelogram.  · Drink plenty of water after the myelogram. Your doctor will give you instructions on taking your regular medicines. When should you call for help? Call 911 anytime you think you may need emergency care. For example, call if:  · You have a seizure. Call your doctor now or seek immediate medical care if:  · You have any increase in pain, weakness, or numbness in your legs. · You have a severe headache or stiff neck, or your eyes become very sensitive to light. · You have a headache that lasts longer than 24 hours. · You have problems urinating or having a bowel movement. · You have a fever. Follow-up care is a key part of your treatment and safety. Be sure to make and go to all appointments, and call your doctor if you are having problems. It's also a good idea to keep a list of the medicines you take. Ask your doctor when you can expect to have your test results. Where can you learn more?   Go to http://www.gray.com/  Enter [id number] in the search box to learn more about \"Myelogram: About This Test.\"  Current as of: December 9, 2019               Content Version: 12.6  © 6149-9284 [employer]. Care instructions adapted under license by [employer] (which disclaims liability or warranty for this information). If you have questions about a medical condition or this instruction, always ask your healthcare professional. [address] any warranty or liability for your use of this information.

## 2020-10-16 NOTE — PERIOP NOTES
[employer] called and said that their business closes at 1700 and will not be able to pick patient up for 1750 discharge home.

## 2021-01-28 ENCOUNTER — OFFICE VISIT (OUTPATIENT)
Dept: CARDIOLOGY CLINIC | Age: 71
End: 2021-01-28
Payer: MEDICARE

## 2021-01-28 VITALS
DIASTOLIC BLOOD PRESSURE: 78 MMHG | HEIGHT: 71 IN | SYSTOLIC BLOOD PRESSURE: 132 MMHG | BODY MASS INDEX: 31.5 KG/M2 | WEIGHT: 225 LBS | OXYGEN SATURATION: 97 % | HEART RATE: 79 BPM

## 2021-01-28 DIAGNOSIS — I42.9 CARDIOMYOPATHY, UNSPECIFIED TYPE (HCC): ICD-10-CM

## 2021-01-28 DIAGNOSIS — I48.92 ATRIAL FLUTTER, UNSPECIFIED TYPE (HCC): Primary | ICD-10-CM

## 2021-01-28 DIAGNOSIS — Z01.810 PREOP CARDIOVASCULAR EXAM: ICD-10-CM

## 2021-01-28 DIAGNOSIS — Z98.890 H/O CARDIAC RADIOFREQUENCY ABLATION: ICD-10-CM

## 2021-01-28 DIAGNOSIS — R94.31 ABNORMAL EKG: ICD-10-CM

## 2021-01-28 DIAGNOSIS — Z79.01 ANTICOAGULATED: ICD-10-CM

## 2021-01-28 DIAGNOSIS — I49.9 IRREGULAR HEART BEAT: ICD-10-CM

## 2021-01-28 PROCEDURE — G8510 SCR DEP NEG, NO PLAN REQD: HCPCS | Performed by: INTERNAL MEDICINE

## 2021-01-28 PROCEDURE — G8536 NO DOC ELDER MAL SCRN: HCPCS | Performed by: INTERNAL MEDICINE

## 2021-01-28 PROCEDURE — 99214 OFFICE O/P EST MOD 30 MIN: CPT | Performed by: INTERNAL MEDICINE

## 2021-01-28 PROCEDURE — 3017F COLORECTAL CA SCREEN DOC REV: CPT | Performed by: INTERNAL MEDICINE

## 2021-01-28 PROCEDURE — G8427 DOCREV CUR MEDS BY ELIG CLIN: HCPCS | Performed by: INTERNAL MEDICINE

## 2021-01-28 PROCEDURE — 1101F PT FALLS ASSESS-DOCD LE1/YR: CPT | Performed by: INTERNAL MEDICINE

## 2021-01-28 PROCEDURE — 93000 ELECTROCARDIOGRAM COMPLETE: CPT | Performed by: INTERNAL MEDICINE

## 2021-01-28 PROCEDURE — G8417 CALC BMI ABV UP PARAM F/U: HCPCS | Performed by: INTERNAL MEDICINE

## 2021-01-28 NOTE — PROGRESS NOTES
History of Present Illness:  72year old male here for follow up. I last saw him in September, 2020. He was evaluated for spinal surgery at [facility] and had a myelogram.  Unfortunately he was told that surgery was not going to be done and he potentially needed pain management and injection in his spine. He did not want to pursue that. He is therefore seeking out a second opinion from a local spine surgeon. He is quite debilitated with back, as well as hip pain. He was evaluated by Dr. Gavi Holland for his hip and currently not planning surgery, although it has been discussed. Denies chest pain, rare palpitations. Significantly, he stopped his Eliquis last year for his myelogram.  Denies any chest pain, syncope, PND, orthopnea, edema. Functional status is limited due to his degenerative joint disease. Impression:  1. Previous plans for spine surgery at [facility] deferred. Recommended injections and pain control. He is getting a second opinion locally. 2. History of atrial flutter with ablation October, 2019, on Cardizem and Eliquis. 3. 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 No echo since that time. 4. Chronic pain with limited functional status. 5. DJD of the left hip, followed by Dr. Gavi Holland, conservatively managed at this time. 6. History of hypertension, controlled. 7. Scoliosis. 8. Remote gastric ulcer. 9. Shellfish allergy. Plan:  His last echocardiogram was 2019, I am going to repeat one, the previous was not performed, to make sure his left ventricular function is improved. If his ejection fraction is not normal, I discussed pursuing pharmacological cardiac nuclear stress test for completeness in case he requires any future orthopedic surgeries. All questions answered.       Past Medical History:   Diagnosis Date    Atrial flutter ([address]     S/P Flutter ablation (09/19)    Colon disorder     Spastic    Erectile dysfunction     Gastric ulcer     Herniated disc     Hypertension     Low back pain     Scoliosis     Vertigo        Current Outpatient Medications   Medication Sig Dispense Refill    tadalafiL (Cialis) 5 mg tablet Take 5 mg by mouth.  loperamide (IMMODIUM) 2 mg tablet Take 2 mg by mouth.  apixaban (ELIQUIS) 5 mg tablet Take 1 Tab by mouth two (2) times a day. 60 Tab 3    dilTIAZem (TIAZAC) 120 mg SR capsule Take 120 mg by mouth daily.  traZODone (DESYREL) 100 mg tablet Take 100 mg by mouth nightly.  BUPROPION HCL PO Take 300 mg by mouth.  pseudoephedrine (SUDAFED) 60 mg tablet Take  by mouth every four (4) hours as needed for Congestion.  traMADol (ULTRAM) 50 mg tablet Take 50 mg by mouth every six (6) hours as needed for Pain.  clonazePAM (KLONOPIN) 1 mg tablet Take  by mouth three (3) times daily.  Omeprazole delayed release (PRILOSEC D/R) 20 mg tablet Take 20 mg by mouth. Take two caps by mouth twice a day      diphenhydrAMINE (BENADRYL) 25 mg capsule Take 25 mg by mouth every six (6) hours as needed. Social History   reports that he has never smoked. He has never used smokeless tobacco.   reports current alcohol use. Family History  family history is not on file. Review of Systems  Except as stated above include:  Constitutional: Negative for fever, chills and malaise/fatigue. HEENT: No congestion or recent URI. Gastrointestinal: No nausea, vomiting, abdominal pain, bloody stools. Pulmonary:  Negative except as stated above. Cardiac:  Negative except as stated above. Musculoskeletal: Negative except as stated above. Neurological:  No localized symptoms. Skin:  Negative except as stated above. Psych:  Negative except as stated above. Endocrine:  Negative except as stated above.     PHYSICAL EXAM  BP Readings from Last 3 Encounters:   10/16/20 111/74   09/17/20 (!) 146/79   09/09/20 132/84     Pulse Readings from Last 3 Encounters:   10/16/20 76   09/17/20 70   09/09/20 81 Wt Readings from Last 3 Encounters:   10/16/20 107.5 kg (237 lb)   09/17/20 106.5 kg (234 lb 12.8 oz)   09/09/20 108 kg (238 lb)     General:   Well developed, well groomed. Head/Neck:   No obvious jugular venous distention     No obvious carotid pulsations. No evidence of xanthelasma. Lungs:   No respiratory distress. Clear bilaterally. Heart:  Regular rate and rhythm. Normal S1/S2. Palpation grossly normal.    No significant murmurs, rubs or gallops. Abdomen:   Non-acute abdomen. No obvious pulsations. Extremities:   Intact peripheral pulses. No significant edema. Neurological:   Alert and oriented to person, place, time. No focal neurological deficit visually. Skin:   No obvious rash    Blood Pressure Metric:  Monitor recommended and adjustments stated if needed.

## 2021-01-28 NOTE — PROGRESS NOTES
Emmie Josue presents today for No chief complaint on file. Emmie Josue preferred language for health care discussion is english/other. Is someone accompanying this pt? no    Is the patient using any DME equipment during [address]? no    Depression Screening:  3 most recent PHQ Screens 1/28/2021   Little interest or pleasure in doing things Not at all   Feeling down, depressed, irritable, or hopeless Not at all   Total Score PHQ 2 0       Learning Assessment:  Learning Assessment 1/28/2021   PRIMARY LEARNER Patient   PRIMARY LANGUAGE ENGLISH   LEARNER PREFERENCE PRIMARY DEMONSTRATION   ANSWERED BY patient   RELATIONSHIP SELF       Abuse Screening:  Abuse Screening Questionnaire 1/28/2021   Do you ever feel afraid of your partner? N   Are you in a relationship with someone who physically or mentally threatens you? N   Is it safe for you to go home? Y       Fall Risk  Fall Risk Assessment, last 12 mths 1/28/2021   Able to walk? Yes   Fall in past 12 months? 0   Do you feel unsteady? 0   Are you worried about falling 0   Number of falls in past 12 months -   Fall with injury? -       Pt currently taking Anticoagulant therapy? Eliquis 5 mg twice a day    Coordination of Care:  1. Have you been to the ER, urgent care clinic since your last visit? Hospitalized since your last visit? no    2. Have you seen or consulted any other health care providers outside of the [address] [place] [address] since your last visit? Include any pap smears or colon screening.  no

## 2021-02-05 ENCOUNTER — TELEPHONE (OUTPATIENT)
Dept: CARDIOLOGY CLINIC | Age: 71
End: 2021-02-05

## 2021-03-05 PROBLEM — R26.81 UNSTEADY GAIT: Status: ACTIVE | Noted: 2018-09-06

## 2021-03-05 PROBLEM — R79.89 DECREASED TESTOSTERONE LEVEL: Status: ACTIVE | Noted: 2020-09-01

## 2021-03-05 PROBLEM — I48.0 PAROXYSMAL ATRIAL FIBRILLATION (HCC): Status: ACTIVE | Noted: 2020-03-05

## 2021-03-05 PROBLEM — R27.0 ATAXIA: Status: ACTIVE | Noted: 2018-09-06

## 2021-03-05 PROBLEM — G43.109 OCULAR MIGRAINE: Status: ACTIVE | Noted: 2017-04-25

## 2021-03-05 PROBLEM — Z98.890 HISTORY OF COLONOSCOPY: Status: ACTIVE | Noted: 2020-09-01

## 2021-03-05 PROBLEM — Z87.442 HISTORY OF RENAL CALCULI: Status: ACTIVE | Noted: 2020-12-15

## 2021-03-05 PROBLEM — K21.9 GASTROESOPHAGEAL REFLUX DISEASE: Status: ACTIVE | Noted: 2020-06-02

## 2021-05-13 ENCOUNTER — TELEPHONE (OUTPATIENT)
Dept: CARDIOLOGY CLINIC | Age: 71
End: 2021-05-13

## 2021-09-14 ENCOUNTER — OFFICE VISIT (OUTPATIENT)
Dept: ORTHOPEDIC SURGERY | Age: 71
End: 2021-09-14
Payer: MEDICARE

## 2021-09-14 VITALS
HEART RATE: 77 BPM | TEMPERATURE: 97.7 F | OXYGEN SATURATION: 95 % | BODY MASS INDEX: 30.48 KG/M2 | HEIGHT: 72 IN | WEIGHT: 225 LBS

## 2021-09-14 DIAGNOSIS — M70.61 GREATER TROCHANTERIC BURSITIS OF RIGHT HIP: ICD-10-CM

## 2021-09-14 DIAGNOSIS — M25.551 RIGHT HIP PAIN: Primary | ICD-10-CM

## 2021-09-14 DIAGNOSIS — M16.11 PRIMARY OSTEOARTHRITIS OF RIGHT HIP: ICD-10-CM

## 2021-09-14 PROCEDURE — G8536 NO DOC ELDER MAL SCRN: HCPCS | Performed by: ORTHOPAEDIC SURGERY

## 2021-09-14 PROCEDURE — G8756 NO BP MEASURE DOC: HCPCS | Performed by: ORTHOPAEDIC SURGERY

## 2021-09-14 PROCEDURE — 20610 DRAIN/INJ JOINT/BURSA W/O US: CPT | Performed by: ORTHOPAEDIC SURGERY

## 2021-09-14 PROCEDURE — 99214 OFFICE O/P EST MOD 30 MIN: CPT | Performed by: ORTHOPAEDIC SURGERY

## 2021-09-14 PROCEDURE — 3017F COLORECTAL CA SCREEN DOC REV: CPT | Performed by: ORTHOPAEDIC SURGERY

## 2021-09-14 PROCEDURE — 1101F PT FALLS ASSESS-DOCD LE1/YR: CPT | Performed by: ORTHOPAEDIC SURGERY

## 2021-09-14 PROCEDURE — G8417 CALC BMI ABV UP PARAM F/U: HCPCS | Performed by: ORTHOPAEDIC SURGERY

## 2021-09-14 PROCEDURE — G9717 DOC PT DX DEP/BP F/U NT REQ: HCPCS | Performed by: ORTHOPAEDIC SURGERY

## 2021-09-14 PROCEDURE — G8427 DOCREV CUR MEDS BY ELIG CLIN: HCPCS | Performed by: ORTHOPAEDIC SURGERY

## 2021-09-14 RX ORDER — BETAMETHASONE SODIUM PHOSPHATE AND BETAMETHASONE ACETATE 3; 3 MG/ML; MG/ML
6 INJECTION, SUSPENSION INTRA-ARTICULAR; INTRALESIONAL; INTRAMUSCULAR; SOFT TISSUE ONCE
Status: COMPLETED | OUTPATIENT
Start: 2021-09-14 | End: 2021-09-14

## 2021-09-14 RX ADMIN — BETAMETHASONE SODIUM PHOSPHATE AND BETAMETHASONE ACETATE 6 MG: 3; 3 INJECTION, SUSPENSION INTRA-ARTICULAR; INTRALESIONAL; INTRAMUSCULAR; SOFT TISSUE at 11:05

## 2021-09-14 NOTE — PROGRESS NOTES
Patient: Ishaan Estrada                MRN: [id number]       SSN: [id number]  YOB: 1950        AGE: 80 y.o. SEX: male  Body mass index is 30.73 kg/m². PCP: Kathrine Jaime MD  09/14/21    Mr. Domingo Correa presents today with a right-sided hip pain he has had chronic back problems over the years and kyphosis seen [facility] and he has known advanced arthritis of the left hip we managed him with a bursal injection and with an option for an intra-articular injection is fairly quiescent currently of the right hip he is complaining of pain mostly laterally but somewhat in the groin as well sometimes some stiffness putting shoes and socks on getting into the car or getting in and out of a chair he would like an injection for his bursa today for moderate usually nonradicular pain he has some no numbness in both legs is and has had some previous back surgery in the past    The examination at today's [facility] body mass index is 31 both hips are touch stiff but not particularly irritable especially with internal rotation and is tender over the right greater trochanter low back somewhat tender as well and he is got some patchy numbness involving L4 and 5 although no foot drop and straight leg raise just equivocal    X-rays today 9/14/2021 AP pelvis AP and lateral the right hip confirmed moderately advanced arthritis right hip with a somewhat extruded H shaped head with some chronic cam impingement    I like to pursue nonoperative measures and there was a discussion with regards to this I did offer him 2 injections intra-articular and bursal he just like to try the bursal for the time being    I think this is very reasonable therefore right bursa injected and is welcome to have an intra-articular injection is can be traveling to the [facility] in the not-too-distant future and he is can to be getting     I will see him back as needed would be very happy to arrange an intra-articular injection eventually total hip replacement    REVIEW OF SYSTEMS:      CON: negative  EYE: negative   ENT: negative  RESP: negative  GI:    negative   :  negative  MSK: Positive  A twelve point review of systems was completed, positives noted and all other systems were reviewed and are negative          Past Medical History:   Diagnosis Date    Atrial flutter (HCC)     S/P Flutter ablation (09/19)    Colon disorder     Spastic    Erectile dysfunction     Gastric ulcer     Hematuria, gross     Herniated disc     Hypertension     Low back pain     Scoliosis     Vertigo        No family history on file. Current Outpatient Medications   Medication Sig Dispense Refill    atenoloL (TENORMIN) 50 mg tablet Take 50 mg by mouth daily.  sildenafil citrate (VIAGRA) 100 mg tablet Take 100 mg by mouth as needed for Erectile Dysfunction.  loperamide (IMMODIUM) 2 mg tablet Take 2 mg by mouth.  apixaban (ELIQUIS) 5 mg tablet Take 1 Tab by mouth two (2) times a day. 60 Tab 3    dilTIAZem (TIAZAC) 120 mg SR capsule Take 120 mg by mouth daily.  traZODone (DESYREL) 100 mg tablet Take 100 mg by mouth nightly.  BUPROPION HCL PO Take 300 mg by mouth.  pseudoephedrine (SUDAFED) 60 mg tablet Take  by mouth every four (4) hours as needed for Congestion.  clonazePAM (KLONOPIN) 1 mg tablet Take  by mouth three (3) times daily.  Omeprazole delayed release (PRILOSEC D/R) 20 mg tablet Take 20 mg by mouth. Take two caps by mouth twice a day      diphenhydrAMINE (BENADRYL) 25 mg capsule Take 25 mg by mouth every six (6) hours as needed.  gabapentin (NEURONTIN) 300 mg capsule gabapentin 300 mg capsule (Patient not taking: Reported on 9/14/2021)      sodium chloride-aloe vera (AYR) topical gel Apply  to affected area two (2) times a day.  (Patient not taking: Reported on 9/14/2021)       Current Facility-Administered Medications   Medication Dose Route Frequency Provider Last Rate Last Admin    betamethasone (CELESTONE) injection 6 mg  6 mg IntraBURSal ONCE Joel Huang MD           Allergies   Allergen Reactions    Latex, Natural Rubber Other (comments)    Adhesive Tape-Silicones Hives    Cephalexin Other (comments)    Darvon [Propoxyphene] Nausea and Vomiting    Shellfish Containing Products Unknown (comments)       Past Surgical History:   Procedure Laterality Date    HX BACK SURGERY  06/03/1999    HX BACK SURGERY  05/22/2015    HX HEENT      Nasal    HX KNEE ARTHROSCOPY      Rt knee    HX ORTHOPAEDIC      HX ORTHOPAEDIC      Lt ankle    HX SHOULDER ARTHROSCOPY      Rt shoulder    PA APPENDECTOMY      PA COMPRE ELECTROPHYSIOL XM W/LEFT ATRIAL PACNG/REC N/A 10/8/2019    Lt Atrial Pace & Record During Ep Study performed by Verner Safe, MD at [facility] CATH LAB    PA EPHYS EVAL W/ABLATION [address] Right 10/8/2019    ABLATION A-FLUTTER/MARKUS with anesthesia prior performed by Verner Safe, MD at [facility] CATH LAB       Social History     Socioeconomic History    Marital status:      Spouse name: Not on file    Number of children: Not on file    Years of education: Not on file    Highest education level: Not on file   Occupational History    Not on file   Tobacco Use    Smoking status: Never Smoker    Smokeless tobacco: Never Used   Vaping Use    Vaping Use: Never used   Substance and Sexual Activity    Alcohol use: Yes     Comment: rare    Drug use: No    Sexual activity: Not Currently     Partners: Female   Other Topics Concern    Not on file   Social History Narrative    Not on file     Social Determinants of Health     Financial Resource Strain:     Difficulty of Paying Living Expenses:    Food Insecurity:     Worried About Running Out of Food in the Last Year:     Ran Out of Food in the Last Year:    Transportation Needs:     Lack of Transportation (Medical):      Lack of Transportation (Non-Medical):    Physical Activity:     Days of Exercise per Week:     Minutes of Exercise per Session:    Stress:     Feeling of Stress :    Social Connections:     Frequency of Communication with Friends and Family:     Frequency of Social Gatherings with Friends and Family:     Attends Mor[employer] Services:     Active Member of Clubs or Organizations:     Attends Club or Organization Meetings:     Marital Status:    Intimate Partner Violence:     Fear of Current or Ex-Partner:     Emotionally Abused:     Physically Abused:     Sexually Abused:        Visit Vitals  Pulse 77   Temp 97.7 °F (36.5 °C) (Temporal)   Ht 5' 11.75\" (1.822 m)   Wt 225 lb (102.1 kg)   SpO2 95%   BMI 30.73 kg/m²         PHYSICAL EXAMINATION:  GENERAL: Alert and oriented x3, in no acute distress, well-developed, well-nourished, afebrile. HEART: No JVD. EYES: No scleral icterus   NECK: No significant lymphadenopathy   LUNGS: No respiratory compromise or indrawing  ABDOMEN: Soft, non-tender, non-distended. Note: This note was completed using voice recognition software. Any typographical/name errors or mistakes are unintentional.    Electronically signed by: MD DONNA Ram, Kedar Penaloza M.D., have reviewed the history, physical, and have updated the allergic reactions for Elfida Rochelle. TIME OUT performed immediately prior to the start of procedure:  Bobby Ordaz M.D., have performed the following reviews on Elfida Rochelle prior to the start of the procedure:    - Patient was identified by name and date of birth  - Agreement on procedure being performed was verified  - Risks and benefits explained to the patient  - Patient was positioned for comfort  - Consent was signed and verified  - Patient was advised regarding risks of bruising, bleeding, infection and pain    Time: 11:03 AM     Body Part: intra-bursal injection of right hip.     Medication and Dose: 1mL Celestone preparation, i.e. 6 mg, and 3 mL 1% lidocaine    Date of Procedure: 09/14/21    PROCEDURE PERFORMED BY : Dao Sevilla M.D., [facility])    Provider Assisted by: Zoë Hough    Patient assisted by: self    Patient tolerated procedure well with no complications

## 2022-03-18 PROBLEM — R79.89 DECREASED TESTOSTERONE LEVEL: Status: ACTIVE | Noted: 2020-09-01

## 2022-03-18 PROBLEM — G43.109 OCULAR MIGRAINE: Status: ACTIVE | Noted: 2017-04-25

## 2022-03-18 PROBLEM — M48.061 LUMBAR SPINAL STENOSIS: Status: ACTIVE | Noted: 2017-05-09

## 2022-03-19 PROBLEM — R26.81 UNSTEADY GAIT: Status: ACTIVE | Noted: 2018-09-06

## 2022-03-19 PROBLEM — I48.0 PAROXYSMAL ATRIAL FIBRILLATION (HCC): Status: ACTIVE | Noted: 2020-03-05

## 2022-03-19 PROBLEM — Z98.890 HISTORY OF COLONOSCOPY: Status: ACTIVE | Noted: 2020-09-01

## 2022-03-19 PROBLEM — R27.0 ATAXIA: Status: ACTIVE | Noted: 2018-09-06

## 2022-03-19 PROBLEM — Z87.442 HISTORY OF RENAL CALCULI: Status: ACTIVE | Noted: 2020-12-15

## 2022-03-20 PROBLEM — K21.9 GASTROESOPHAGEAL REFLUX DISEASE: Status: ACTIVE | Noted: 2020-06-02

## 2022-06-16 ENCOUNTER — OFFICE VISIT (OUTPATIENT)
Dept: ORTHOPEDIC SURGERY | Age: 72
End: 2022-06-16
Payer: MEDICARE

## 2022-06-16 VITALS
WEIGHT: 222.2 LBS | BODY MASS INDEX: 31.11 KG/M2 | TEMPERATURE: 97.8 F | HEIGHT: 71 IN | OXYGEN SATURATION: 95 % | HEART RATE: 102 BPM

## 2022-06-16 DIAGNOSIS — M70.61 GREATER TROCHANTERIC BURSITIS OF RIGHT HIP: ICD-10-CM

## 2022-06-16 DIAGNOSIS — M16.11 PRIMARY OSTEOARTHRITIS OF RIGHT HIP: Primary | ICD-10-CM

## 2022-06-16 DIAGNOSIS — M54.2 NECK PAIN: ICD-10-CM

## 2022-06-16 DIAGNOSIS — M16.12 PRIMARY OSTEOARTHRITIS OF LEFT HIP: ICD-10-CM

## 2022-06-16 DIAGNOSIS — M25.552 LEFT HIP PAIN: ICD-10-CM

## 2022-06-16 DIAGNOSIS — M25.551 RIGHT HIP PAIN: ICD-10-CM

## 2022-06-16 PROCEDURE — 73502 X-RAY EXAM HIP UNI 2-3 VIEWS: CPT | Performed by: ORTHOPAEDIC SURGERY

## 2022-06-16 PROCEDURE — 20610 DRAIN/INJ JOINT/BURSA W/O US: CPT | Performed by: ORTHOPAEDIC SURGERY

## 2022-06-16 RX ORDER — BETAMETHASONE SODIUM PHOSPHATE AND BETAMETHASONE ACETATE 3; 3 MG/ML; MG/ML
6 INJECTION, SUSPENSION INTRA-ARTICULAR; INTRALESIONAL; INTRAMUSCULAR; SOFT TISSUE ONCE
Status: COMPLETED | OUTPATIENT
Start: 2022-06-16 | End: 2022-06-16

## 2022-06-16 RX ADMIN — BETAMETHASONE SODIUM PHOSPHATE AND BETAMETHASONE ACETATE 6 MG: 3; 3 INJECTION, SUSPENSION INTRA-ARTICULAR; INTRALESIONAL; INTRAMUSCULAR; SOFT TISSUE at 11:27

## 2022-06-16 NOTE — PROGRESS NOTES
Patient: Suzy Paiz                MRN: [id number]       SSN: [id number]  YOB: 1950        AGE: 71 y.o. SEX: male  Body mass index is 30.99 kg/m².     PCP: Laura Rodriguez MD  06/16/22    Андрей Grajeda presents today for reevaluation of hip pain previously treated on the left side is more symptomatic on the right side at this point in time he does get groin pain does hurt him to roll over on it he has neck and back problems he seen Dr. Kallie Cooper in the past and [facility] also [facility]    The examination today he is a little bit kyphotic his neck is slightly rotated and tends to sit with a little bit of lateral flexion of the left hip is noncontributory today the right hip has about 15 degrees may be a little less of internal rotation before he reproduces some groin discomfort is also tender over the trochanter as well calf nontender Anaid Girt' sign is negative mild evidence of neuropathy distally no foot drop however calf nontender skin is normal    Rib x-ray today AP pelvis on 6/16/2022 confirms advancing arthritis I would call it into the severe category at this point    He like to try with nonoperative measures there was a discussion regarding surgery was decided is not currently recommended we should pursue nonoperative measures and therefore right bursal injection performed in the office today as per protocol and we will arrange for an intra-articular injection form as well been a pleasure to share in his care thank you    REVIEW OF SYSTEMS:      CON: negative  EYE: negative   ENT: negative  RESP: negative  GI:    negative   :  negative  MSK: Positive  A twelve point review of systems was completed, positives noted and all other systems were reviewed and are negative          Past Medical History:   Diagnosis Date    Atrial flutter ([facility] [address]     S/P Flutter ablation (09/19)    Colon disorder     Spastic    Erectile dysfunction     Gastric ulcer     Hematuria, gross     Herniated disc     Hypertension     Low back pain     Scoliosis     Vertigo        History reviewed. No pertinent family history. Current Outpatient Medications   Medication Sig Dispense Refill    hydrocortisone (HYTONE) 2.5 % topical cream Apply  to affected area.  nystatin (MYCOSTATIN) topical cream Apply  to affected area.  zinc oxide 20 % ointment Apply  to affected area.  safety syr with ndl,disp, tray (Easy Touch Syr Allergy Tray) 1 mL 27 gauge x 1/2\" tray 1 Each by Does Not Apply route as needed (For use with Inject Ease). 25 Pen Needle 3    Injector Device (Inject-Ease) jose 1 Each by Does Not Apply route as needed (For use with allergy syringes for intracavernosal injections. ). 1 Each 1    tri mix compound PGE1  20 mcg/ml  Papaverine 30  mg/ml   Phentolamine 2 mg/ml. Patient to inject 0.8 to 1.0 ml as directed. 10 mL 5    sildenafil citrate (VIAGRA) 100 mg tablet Take 100 mg by mouth as needed for Erectile Dysfunction.  loperamide (IMMODIUM) 2 mg tablet Take 2 mg by mouth.  apixaban (ELIQUIS) 5 mg tablet Take 1 Tab by mouth two (2) times a day. 60 Tab 3    dilTIAZem (TIAZAC) 120 mg SR capsule Take 120 mg by mouth daily.  traZODone (DESYREL) 100 mg tablet Take 100 mg by mouth nightly.  BUPROPION HCL PO Take 300 mg by mouth.  pseudoephedrine (SUDAFED) 60 mg tablet Take  by mouth every four (4) hours as needed for Congestion.  clonazePAM (KLONOPIN) 1 mg tablet Take  by mouth three (3) times daily.  Omeprazole delayed release (PRILOSEC D/R) 20 mg tablet Take 20 mg by mouth. Take two caps by mouth twice a day      diphenhydrAMINE (BENADRYL) 25 mg capsule Take 25 mg by mouth every six (6) hours as needed.            Allergies   Allergen Reactions    Latex, Natural Rubber Other (comments)    Adhesive Tape-Silicones Hives    Cephalexin Other (comments)    Darvon [Propoxyphene] Nausea and Vomiting    Shellfish Containing Products Unknown (comments)       Past Surgical History:   Procedure Laterality Date    HX BACK SURGERY  06/03/1999    HX BACK SURGERY  05/22/2015    HX HEENT      Nasal    HX KNEE ARTHROSCOPY      Rt knee    HX ORTHOPAEDIC      HX ORTHOPAEDIC      Lt ankle    HX SHOULDER ARTHROSCOPY      Rt shoulder    VT APPENDECTOMY      VT COMPRE ELECTROPHYSIOL XM W/LEFT ATRIAL PACNG/REC N/A 10/8/2019    Lt Atrial Pace & Record During Ep Study performed by Cosmo Baez MD at [facility] CATH LAB    VT COMPRE EP EVAL ABLTJ 3D MAPG TX SVT Right 10/8/2019    ABLATION A-FLUTTER/MARKUS with anesthesia prior performed by Cosmo Baez MD at [facility] CATH LAB       Social History     Socioeconomic History    Marital status:      Spouse name: Not on file    Number of children: Not on file    Years of education: Not on file    Highest education level: Not on file   Occupational History    Not on file   Tobacco Use    Smoking status: Never Smoker    Smokeless tobacco: Never Used   Vaping Use    Vaping Use: Never used   Substance and Sexual Activity    Alcohol use: Yes     Comment: rare    Drug use: No    Sexual activity: Not Currently     Partners: Female   Other Topics Concern    Not on file   Social History Narrative    Not on file     Social Determinants of Health     Financial Resource Strain:     Difficulty of Paying Living Expenses: Not on file   Food Insecurity:     Worried About Running Out of Food in the Last Year: Not on file    Mehnaz of Food in the Last Year: Not on file   Transportation Needs:     Lack of Transportation (Medical): Not on file    Lack of Transportation (Non-Medical):  Not on file   Physical Activity:     Days of Exercise per Week: Not on file    Minutes of Exercise per Session: Not on file   Stress:     Feeling of Stress : Not on file   Social Connections:     Frequency of Communication with Friends and Family: Not on file    Frequency of Social Gatherings with Friends and Family: Not on file    Attends Rastafari Services: Not on file    Active Member of Clubs or Organizations: Not on file    Attends Club or Organization Meetings: Not on file    Marital Status: Not on file   Intimate Partner Violence:     Fear of Current or Ex-Partner: Not on file    Emotionally Abused: Not on file    Physically Abused: Not on file    Sexually Abused: Not on file   Housing Stability:     Unable to Pay for Housing in the Last Year: Not on file    Number of [place] in the Last Year: Not on file    Unstable Housing in the Last Year: Not on file       Visit Vitals  Pulse (!) 102   Temp 97.8 °F (36.6 °C) (Temporal)   Ht 5' 11\" (1.803 m)   Wt 100.8 kg (222 lb 3.2 oz)   SpO2 95%   BMI 30.99 kg/m²         PHYSICAL EXAMINATION:  GENERAL: Alert and oriented x3, in no acute distress, well-developed, well-nourished, afebrile. HEART: No JVD. EYES: No scleral icterus   NECK: No significant lymphadenopathy   LUNGS: No respiratory compromise or indrawing  ABDOMEN: Soft, non-tender, non-distended. [facility]  OFFICE PROCEDURE PROGRESS NOTE      Chart reviewed for the following:  Puneet THOMPSON M.D., have reviewed the History, Physical and updated the Allergic reactions for Khoa Bhatia? TIME OUT performed immediately prior to start of procedure:  Puneet THOMPSON M.D., have performed the following reviews on Khoa Bhatia prior to the start of the procedure:  ????????  * Patient was identified by name and date of birth   * Patient advised regarding risks of bruising, bleeding, infection and pain  * Agreement on procedure being performed was verified  * Risks and Benefits explained to the patient  * Procedure site verified and marked as necessary  * Patient was positioned for comfort  * Consent was signed and verified    Time: 11:27 AM    Body part: Right hip Intra-bursal    Medication & Dose: 1mL Celestone preparation, i.e. 6 mg. ; 3mL 1% Lidocaine    Date of procedure: 06/16/22    Procedure performed by: Ginger Gutierrez. Chris Pimentel M.D., [facility])    Provider assisted by: Karin Villegas    Patient assisted by: self    How tolerated by patient: tolerated the procedure well with no complications      Note: This note was completed using voice recognition software. Any typographical/name errors or mistakes are unintentional.    Electronically signed by:  uDnia Correa MD

## 2022-06-23 DIAGNOSIS — M25.551 RIGHT HIP PAIN: ICD-10-CM

## 2022-06-23 DIAGNOSIS — M16.12 PRIMARY OSTEOARTHRITIS OF LEFT HIP: ICD-10-CM

## 2022-06-23 DIAGNOSIS — M16.11 PRIMARY OSTEOARTHRITIS OF RIGHT HIP: ICD-10-CM

## 2022-06-23 DIAGNOSIS — M25.552 LEFT HIP PAIN: ICD-10-CM

## 2022-07-18 ENCOUNTER — HOSPITAL ENCOUNTER (OUTPATIENT)
Dept: GENERAL RADIOLOGY | Age: 72
Discharge: HOME | End: 2022-07-18
Attending: ORTHOPAEDIC SURGERY
Payer: MEDICARE

## 2022-07-18 PROCEDURE — 77002 NEEDLE LOCALIZATION BY XRAY: CPT

## 2022-07-18 PROCEDURE — 74011000250 HC RX REV CODE- 250: Performed by: ORTHOPAEDIC SURGERY

## 2022-07-18 PROCEDURE — 74011000636 HC RX REV CODE- 636: Performed by: ORTHOPAEDIC SURGERY

## 2022-07-18 PROCEDURE — 74011250636 HC RX REV CODE- 250/636: Performed by: ORTHOPAEDIC SURGERY

## 2022-07-18 RX ORDER — LIDOCAINE HYDROCHLORIDE 10 MG/ML
5 INJECTION, SOLUTION EPIDURAL; INFILTRATION; INTRACAUDAL; PERINEURAL
Status: COMPLETED | OUTPATIENT
Start: 2022-07-18 | End: 2022-07-18

## 2022-07-18 RX ORDER — TRIAMCINOLONE ACETONIDE 40 MG/ML
40 INJECTION, SUSPENSION INTRA-ARTICULAR; INTRAMUSCULAR
Status: COMPLETED | OUTPATIENT
Start: 2022-07-18 | End: 2022-07-18

## 2022-07-18 RX ADMIN — TRIAMCINOLONE ACETONIDE 40 MG: 40 INJECTION, SUSPENSION INTRA-ARTICULAR; INTRAMUSCULAR at 09:36

## 2022-07-18 RX ADMIN — LIDOCAINE HYDROCHLORIDE 6 ML: 10 INJECTION, SOLUTION EPIDURAL; INFILTRATION; INTRACAUDAL; PERINEURAL at 09:29

## 2022-07-18 RX ADMIN — LIDOCAINE HYDROCHLORIDE 6 ML: 10 INJECTION, SOLUTION EPIDURAL; INFILTRATION; INTRACAUDAL; PERINEURAL at 09:36

## 2022-07-18 RX ADMIN — IOPAMIDOL 2 ML: 408 INJECTION, SOLUTION INTRATHECAL at 09:36

## 2022-07-18 RX ADMIN — IOPAMIDOL 1 ML: 408 INJECTION, SOLUTION INTRATHECAL at 09:29

## 2022-07-18 RX ADMIN — TRIAMCINOLONE ACETONIDE 40 MG: 40 INJECTION, SUSPENSION INTRA-ARTICULAR; INTRAMUSCULAR at 09:29

## 2022-09-15 ENCOUNTER — OFFICE VISIT (OUTPATIENT)
Dept: CARDIOLOGY CLINIC | Age: 72
End: 2022-09-15
Payer: MEDICARE

## 2022-09-15 VITALS
WEIGHT: 213 LBS | HEIGHT: 71 IN | DIASTOLIC BLOOD PRESSURE: 60 MMHG | HEART RATE: 98 BPM | OXYGEN SATURATION: 95 % | SYSTOLIC BLOOD PRESSURE: 102 MMHG | BODY MASS INDEX: 29.82 KG/M2

## 2022-09-15 DIAGNOSIS — I49.9 IRREGULAR HEART BEAT: ICD-10-CM

## 2022-09-15 DIAGNOSIS — I48.92 ATRIAL FLUTTER, UNSPECIFIED TYPE (HCC): Primary | ICD-10-CM

## 2022-09-15 DIAGNOSIS — M47.9 OSTEOARTHRITIS OF SPINE, UNSPECIFIED SPINAL OSTEOARTHRITIS COMPLICATION STATUS, UNSPECIFIED SPINAL REGION: ICD-10-CM

## 2022-09-15 DIAGNOSIS — Z01.810 PREOP CARDIOVASCULAR EXAM: ICD-10-CM

## 2022-09-15 DIAGNOSIS — I42.9 CARDIOMYOPATHY, UNSPECIFIED TYPE (HCC): ICD-10-CM

## 2022-09-15 DIAGNOSIS — Z79.01 ANTICOAGULATED: ICD-10-CM

## 2022-09-15 DIAGNOSIS — I48.91 ATRIAL FIBRILLATION, UNSPECIFIED TYPE (HCC): ICD-10-CM

## 2022-09-15 PROCEDURE — G8536 NO DOC ELDER MAL SCRN: HCPCS | Performed by: INTERNAL MEDICINE

## 2022-09-15 PROCEDURE — G8417 CALC BMI ABV UP PARAM F/U: HCPCS | Performed by: INTERNAL MEDICINE

## 2022-09-15 PROCEDURE — G8427 DOCREV CUR MEDS BY ELIG CLIN: HCPCS | Performed by: INTERNAL MEDICINE

## 2022-09-15 PROCEDURE — G8754 DIAS BP LESS 90: HCPCS | Performed by: INTERNAL MEDICINE

## 2022-09-15 PROCEDURE — G9717 DOC PT DX DEP/BP F/U NT REQ: HCPCS | Performed by: INTERNAL MEDICINE

## 2022-09-15 PROCEDURE — 1123F ACP DISCUSS/DSCN MKR DOCD: CPT | Performed by: INTERNAL MEDICINE

## 2022-09-15 PROCEDURE — 1101F PT FALLS ASSESS-DOCD LE1/YR: CPT | Performed by: INTERNAL MEDICINE

## 2022-09-15 PROCEDURE — 3017F COLORECTAL CA SCREEN DOC REV: CPT | Performed by: INTERNAL MEDICINE

## 2022-09-15 PROCEDURE — 93000 ELECTROCARDIOGRAM COMPLETE: CPT | Performed by: INTERNAL MEDICINE

## 2022-09-15 PROCEDURE — 99215 OFFICE O/P EST HI 40 MIN: CPT | Performed by: INTERNAL MEDICINE

## 2022-09-15 PROCEDURE — G8752 SYS BP LESS 140: HCPCS | Performed by: INTERNAL MEDICINE

## 2022-09-15 RX ORDER — DEXTROMETHORPHAN HYDROBROMIDE, GUAIFENESIN 5; 100 MG/5ML; MG/5ML
650 LIQUID ORAL EVERY 8 HOURS
COMMUNITY

## 2022-09-15 NOTE — PROGRESS NOTES
History of Present Illness:  71 YOM here for follow up. He had a remote history of atrial flutter with ablation in 2019. When I saw him early in 2021, he was doing well, in sinus. Since then he has developed coarse atrial fibrillation, left sided atrial tachycardia cannot be excluded versus atypical atrial flutter. He is taking Eliquis with some minor bruising issues and very occasional hematuria. No chest pain. His functional status is limited mostly to his back and he is getting another opinion at the [place] [address] to consider surgery. Impression:  Ongoing spine issues, seen at [facility], and now going to [place] [address] for consideration for surgery. Chronic pain. Reduced functional status, primarily due to back pain. History of atrial flutter with ablation of right side October 2019, on Cardizem and Eliquis. 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 DJD of left hip. HTN, controlled. Severe scoliosis. Remote gastric ulcers. Shellfish allergy. Plan:  I discussed the possibility of Watchman device or atrial fibrillation ablation, left sided atrial flutter ablation, but given his comorbidities and poor mobility, we both agreed for rate control and anticoagulation at this point. I will see back annually. Past Medical History:   Diagnosis Date    Atrial flutter (HCC)     S/P Flutter ablation (09/19)    Colon disorder     Spastic    Erectile dysfunction     Gastric ulcer     Hematuria, gross     Herniated disc     Hypertension     Low back pain     Scoliosis     Vertigo        Current Outpatient Medications   Medication Sig Dispense Refill    acetaminophen (Tylenol Arthritis Pain) 650 mg TbER Take 650 mg by mouth every eight (8) hours. hydrocortisone (HYTONE) 2.5 % topical cream Apply  to affected area. nystatin (MYCOSTATIN) topical cream Apply  to affected area. zinc oxide 20 % ointment Apply  to affected area.       safety syr with ndl,disp, tray (Easy Touch Syr Allergy Tray) 1 mL 27 gauge x 1/2\" tray 1 Each by Does Not Apply route as needed (For use with Inject Ease). 25 Pen Needle 3    Injector Device (Inject-Ease) jose 1 Each by Does Not Apply route as needed (For use with allergy syringes for intracavernosal injections. ). 1 Each 1    tri mix compound PGE1  20 mcg/ml  Papaverine 30  mg/ml   Phentolamine 2 mg/ml. Patient to inject 0.8 to 1.0 ml as directed. 10 mL 5    sildenafil citrate (VIAGRA) 100 mg tablet Take 50 mg by mouth as needed for Erectile Dysfunction. loperamide (IMMODIUM) 2 mg tablet Take 2 mg by mouth. apixaban (ELIQUIS) 5 mg tablet Take 1 Tab by mouth two (2) times a day. 60 Tab 3    dilTIAZem ER (TIAZAC) 120 mg capsule Take 120 mg by mouth daily. traZODone (DESYREL) 100 mg tablet Take 100 mg by mouth nightly. buPROPion XL (WELLBUTRIN XL) 300 mg XL tablet Take 300 mg by mouth.      pseudoephedrine (SUDAFED) 60 mg tablet Take  by mouth every four (4) hours as needed for Congestion. clonazePAM (KLONOPIN) 1 mg tablet Take  by mouth three (3) times daily. Omeprazole delayed release (PRILOSEC D/R) 20 mg tablet Take 20 mg by mouth. Take two caps by mouth twice a day      diphenhydrAMINE (BENADRYL) 25 mg capsule Take 25 mg by mouth every six (6) hours as needed. Social History   reports that he has never smoked. He has never used smokeless tobacco.   reports current alcohol use. Family History  family history is not on file. Review of Systems  Except as stated above include:  Constitutional: Negative for fever, chills and malaise/fatigue. HEENT: No congestion or recent URI. Gastrointestinal: No nausea, vomiting, abdominal pain, bloody stools. Pulmonary:  Negative except as stated above. Cardiac:  Negative except as stated above. Musculoskeletal: Negative except as stated above. Neurological:  No localized symptoms. Skin:  Negative except as stated above.   Psych:  Negative except as stated above.  Endocrine:  Negative except as stated above. PHYSICAL EXAM  BP Readings from Last 3 Encounters:   09/15/22 102/60   11/24/21 132/78   02/08/21 132/78     Pulse Readings from Last 3 Encounters:   09/15/22 98   06/16/22 (!) 102   09/14/21 77     Wt Readings from Last 3 Encounters:   09/15/22 96.6 kg (213 lb)   06/16/22 100.8 kg (222 lb 3.2 oz)   05/31/22 95.3 kg (210 lb)     General:   Well developed, well groomed. Head/Neck:   No obvious jugular venous distention     No obvious carotid pulsations. No evidence of xanthelasma. Lungs:   No respiratory distress. Clear bilaterally. Heart:  Irreg. Normal S1/S2. Palpation grossly normal.    No significant murmurs, rubs or gallops. Abdomen:   Non-acute abdomen. No obvious pulsations. Extremities:   Intact peripheral pulses. No significant edema. Neurological:   Alert and oriented to person, place, time. No focal neurological deficit visually. Skin:   No obvious rash    Blood Pressure Metric:  Monitor recommended and adjustments stated if needed.

## 2022-09-15 NOTE — PROGRESS NOTES
Onelia Boles presents today for   Chief Complaint   Patient presents with    Follow-up     Overdue follow up       Palpitations     Pounding occasionally       Leg Swelling     Bilateral lower extremities edema          Onelia Boles preferred language for health care discussion is english/other. Is someone accompanying this pt? no    Is the patient using any DME equipment during [address]? cane  Depression Screening:  3 most recent PHQ Screens 1/28/2021   Little interest or pleasure in doing things Not at all   Feeling down, depressed, irritable, or hopeless Not at all   Total Score PHQ 2 0       Learning Assessment:  Learning Assessment 1/28/2021   PRIMARY LEARNER Patient   PRIMARY LANGUAGE ENGLISH   LEARNER PREFERENCE PRIMARY DEMONSTRATION   ANSWERED BY patient   RELATIONSHIP SELF       Abuse Screening:  Abuse Screening Questionnaire 1/28/2021   Do you ever feel afraid of your partner? N   Are you in a relationship with someone who physically or mentally threatens you? N   Is it safe for you to go home? Y       Fall Risk  Fall Risk Assessment, last 12 mths 9/14/2021   Able to walk? Yes   Fall in past 12 months? 1   Do you feel unsteady? 1   Are you worried about falling 1   Is TUG test greater than 12 seconds? 0   Is the gait abnormal? 0   Number of falls in past 12 months 2   Fall with injury? 1       Pt currently taking Anticoagulant therapy? Eliquis 5mg twice a day     Coordination of Care:  1. Have you been to the ER, urgent care clinic since your last visit? Hospitalized since your last visit? no    2. Have you seen or consulted any other health care providers outside of the [address] [place] [address] since your last visit? Include any pap smears or colon screening.  no

## 2023-05-02 ENCOUNTER — OFFICE VISIT (OUTPATIENT)
Age: 73
End: 2023-05-02
Payer: MEDICARE

## 2023-05-02 VITALS — RESPIRATION RATE: 18 BRPM | HEIGHT: 71 IN | BODY MASS INDEX: 29.29 KG/M2

## 2023-05-02 DIAGNOSIS — M25.512 ACUTE PAIN OF LEFT SHOULDER: ICD-10-CM

## 2023-05-02 DIAGNOSIS — M75.102 NONTRAUMATIC TEAR OF LEFT ROTATOR CUFF, UNSPECIFIED TEAR EXTENT: Primary | ICD-10-CM

## 2023-05-02 PROCEDURE — 1123F ACP DISCUSS/DSCN MKR DOCD: CPT | Performed by: ORTHOPAEDIC SURGERY

## 2023-05-02 PROCEDURE — 20610 DRAIN/INJ JOINT/BURSA W/O US: CPT | Performed by: ORTHOPAEDIC SURGERY

## 2023-05-02 PROCEDURE — 73030 X-RAY EXAM OF SHOULDER: CPT | Performed by: ORTHOPAEDIC SURGERY

## 2023-05-02 PROCEDURE — 99213 OFFICE O/P EST LOW 20 MIN: CPT | Performed by: ORTHOPAEDIC SURGERY

## 2023-05-02 PROCEDURE — G8427 DOCREV CUR MEDS BY ELIG CLIN: HCPCS | Performed by: ORTHOPAEDIC SURGERY

## 2023-05-02 PROCEDURE — 1036F TOBACCO NON-USER: CPT | Performed by: ORTHOPAEDIC SURGERY

## 2023-05-02 PROCEDURE — 3017F COLORECTAL CA SCREEN DOC REV: CPT | Performed by: ORTHOPAEDIC SURGERY

## 2023-05-02 PROCEDURE — G8419 CALC BMI OUT NRM PARAM NOF/U: HCPCS | Performed by: ORTHOPAEDIC SURGERY

## 2023-05-02 RX ORDER — TRIAMCINOLONE ACETONIDE 40 MG/ML
40 INJECTION, SUSPENSION INTRA-ARTICULAR; INTRAMUSCULAR ONCE
Status: COMPLETED | OUTPATIENT
Start: 2023-05-02 | End: 2023-05-02

## 2023-05-02 RX ADMIN — TRIAMCINOLONE ACETONIDE 40 MG: 40 INJECTION, SUSPENSION INTRA-ARTICULAR; INTRAMUSCULAR at 15:52

## 2023-05-02 NOTE — PROGRESS NOTES
[facility] AMBULATORY OFFICE NOTE      Patient: Burak Burns                MRN: [id number]       SSN: [id number]  YOB: 1950        AGE: 68 y.o. SEX: male  Body mass index is 29.29 kg/m². PCP: Chandrika Wilkins MD  05/02/23    CHIEF COMPLAINT: Left shoulder pain 10 out of 10    HPI: Burak Burns is a 68 y.o. male patient who complains of several years and decreased range of motion slightly getting worse of left shoulder pain and dysfunction. He denies any specific injury or trauma. He states he had a rotator cuff tear in the right shoulder and also has 1 that is smaller in the left shoulder. He notes more pain in the left shoulder than the right. He has difficulty with raising the arm. No previous treatment to his left shoulder. Past Medical History:   Diagnosis Date    Atrial flutter ([address]     S/P Flutter ablation (09/19)    Colon disorder     Spastic    Erectile dysfunction     Gastric ulcer     Hematuria, gross     Herniated disc     Hypertension     Low back pain     Scoliosis     Vertigo        History reviewed. No pertinent family history. Current Outpatient Medications   Medication Sig Dispense Refill    acetaminophen (TYLENOL) 650 MG extended release tablet Take 650 mg by mouth in the morning and 650 mg at noon and 650 mg in the evening. apixaban (ELIQUIS) 5 MG TABS tablet Take 5 mg by mouth 2 times daily      buPROPion (WELLBUTRIN XL) 300 MG extended release tablet Take 300 mg by mouth      clonazePAM (KLONOPIN) 1 MG tablet Take by mouth 3 times daily.       dilTIAZem (TIAZAC) 120 MG extended release capsule Take 120 mg by mouth daily      diphenhydrAMINE (BENADRYL) 25 MG capsule Take 25 mg by mouth every 6 hours as needed      dutasteride (AVODART) 0.5 MG capsule Take 0.5 mg by mouth      hydrocortisone 2.5 % cream Apply topically      loperamide (IMODIUM A-D) 2 MG tablet Take 2 mg by mouth      nystatin (MYCOSTATIN) 877601

## 2023-09-21 ENCOUNTER — OFFICE VISIT (OUTPATIENT)
Age: 73
End: 2023-09-21
Payer: MEDICARE

## 2023-09-21 VITALS
SYSTOLIC BLOOD PRESSURE: 118 MMHG | OXYGEN SATURATION: 96 % | WEIGHT: 222 LBS | HEART RATE: 73 BPM | DIASTOLIC BLOOD PRESSURE: 80 MMHG | BODY MASS INDEX: 30.96 KG/M2

## 2023-09-21 DIAGNOSIS — I48.0 PAROXYSMAL ATRIAL FIBRILLATION (HCC): Primary | ICD-10-CM

## 2023-09-21 DIAGNOSIS — I10 PRIMARY HYPERTENSION: ICD-10-CM

## 2023-09-21 PROCEDURE — 3079F DIAST BP 80-89 MM HG: CPT | Performed by: INTERNAL MEDICINE

## 2023-09-21 PROCEDURE — 3074F SYST BP LT 130 MM HG: CPT | Performed by: INTERNAL MEDICINE

## 2023-09-21 PROCEDURE — 3017F COLORECTAL CA SCREEN DOC REV: CPT | Performed by: INTERNAL MEDICINE

## 2023-09-21 PROCEDURE — 1036F TOBACCO NON-USER: CPT | Performed by: INTERNAL MEDICINE

## 2023-09-21 PROCEDURE — G8428 CUR MEDS NOT DOCUMENT: HCPCS | Performed by: INTERNAL MEDICINE

## 2023-09-21 PROCEDURE — 1123F ACP DISCUSS/DSCN MKR DOCD: CPT | Performed by: INTERNAL MEDICINE

## 2023-09-21 PROCEDURE — G8417 CALC BMI ABV UP PARAM F/U: HCPCS | Performed by: INTERNAL MEDICINE

## 2023-09-21 PROCEDURE — 99214 OFFICE O/P EST MOD 30 MIN: CPT | Performed by: INTERNAL MEDICINE

## 2023-09-21 NOTE — PROGRESS NOTES
History of Present Illness:  68 YOM here for follow up. He had an atrial flutter ablation back in 2019. Over the past 6-12 months he has had increasing palpitations, previously once a month, then once a week and now once to twice a week. He will get some shortness of breath with this. His functional status is limited due to significant back pain and remote injury back in 2010. He has recently seen a surgeon and contemplating more back surgery. Impression:  Recent increase in palpitations, now once or twice a week with associated dyspnea. Ongoing spine disease, seen at [facility] and also [facility] and now another local surgeon, considering redo back surgery. Chronic pain. Chronically reduced functional status. History of atrial flutter with ablation, right side, October 2019, on Cardizem and Eliquis. Transient cardiomyopathy with EF 45% 2021, as well as 40% April 2019. Hypertension, controlled. Severe scoliosis. Remote ulcers. Shellfish allergy. Plan:  I am going to obtain an event monitor for a couple weeks, as well as echocardiogram and see him back. I am hesitant to proceed with left sided ablation with atrial fibrillation given his severe scoliosis and limited mobility and this will be high risk. When I see him back I will discuss further options of additional rate control agents versus possible antiarrhythmics. All questions answered.

## 2023-10-24 ENCOUNTER — TELEPHONE (OUTPATIENT)
Age: 73
End: 2023-10-24

## 2023-10-24 NOTE — TELEPHONE ENCOUNTER
Called pt to confirm his appointment on 10/25 with Dr. Terrie Sow to devan on his event monitor. Pt said he did not wear his monitor longer than a few days. When asked if he called the office for alternative options, he said he did not call but just sent the whole monitor back. I did let him know the monitor goes back to the company and we don't get notification when it gets returned. Pt said he had seen a provider the other day and they need a cardiac clearance for a procedure. I did let the pt know that he may not be cleared because he hadn't completed the event monitor testing. Pt is keeping appointment on 10/25.

## 2024-03-13 ENCOUNTER — OFFICE VISIT (OUTPATIENT)
Age: 74
End: 2024-03-13
Payer: MEDICARE

## 2024-03-13 ENCOUNTER — HOSPITAL ENCOUNTER (OUTPATIENT)
Facility: HOSPITAL | Age: 74
Discharge: HOME | End: 2024-03-16
Payer: MEDICARE

## 2024-03-13 ENCOUNTER — TELEPHONE (OUTPATIENT)
Age: 74
End: 2024-03-13

## 2024-03-13 VITALS
BODY MASS INDEX: 29.26 KG/M2 | SYSTOLIC BLOOD PRESSURE: 140 MMHG | OXYGEN SATURATION: 98 % | HEART RATE: 61 BPM | WEIGHT: 209 LBS | HEIGHT: 71 IN | DIASTOLIC BLOOD PRESSURE: 60 MMHG

## 2024-03-13 DIAGNOSIS — I48.0 PAROXYSMAL ATRIAL FIBRILLATION (HCC): Primary | ICD-10-CM

## 2024-03-13 DIAGNOSIS — R60.0 LOCALIZED EDEMA: ICD-10-CM

## 2024-03-13 DIAGNOSIS — R07.9 CHEST PAIN, UNSPECIFIED TYPE: ICD-10-CM

## 2024-03-13 LAB
ALBUMIN SERPL-MCNC: 3.7 G/DL (ref 3.4–5)
ALBUMIN/GLOB SERPL: 1.1 (ref 0.8–1.7)
ALP SERPL-CCNC: 64 U/L (ref 45–117)
ALT SERPL-CCNC: 16 U/L (ref 16–61)
ANION GAP SERPL CALC-SCNC: 9 MMOL/L (ref 3–18)
AST SERPL-CCNC: 11 U/L (ref 10–38)
BILIRUB SERPL-MCNC: 0.3 MG/DL (ref 0.2–1)
BUN SERPL-MCNC: 16 MG/DL (ref 7–18)
BUN/CREAT SERPL: 24 (ref 12–20)
CALCIUM SERPL-MCNC: 9.2 MG/DL (ref 8.5–10.1)
CHLORIDE SERPL-SCNC: 110 MMOL/L (ref 100–111)
CO2 SERPL-SCNC: 27 MMOL/L (ref 21–32)
CREAT SERPL-MCNC: 0.67 MG/DL (ref 0.6–1.3)
ERYTHROCYTE [DISTWIDTH] IN BLOOD BY AUTOMATED COUNT: 13.7 % (ref 11.6–14.5)
GLOBULIN SER CALC-MCNC: 3.5 G/DL (ref 2–4)
GLUCOSE SERPL-MCNC: 108 MG/DL (ref 74–99)
HCT VFR BLD AUTO: 34.6 % (ref 36–48)
HGB BLD-MCNC: 11.2 G/DL (ref 13–16)
INR PPP: 1.4 (ref 0.9–1.1)
MCH RBC QN AUTO: 31.8 PG (ref 24–34)
MCHC RBC AUTO-ENTMCNC: 32.4 G/DL (ref 31–37)
MCV RBC AUTO: 98.3 FL (ref 78–100)
NRBC # BLD: 0 K/UL (ref 0–0.01)
NRBC BLD-RTO: 0 PER 100 WBC
PLATELET # BLD AUTO: 260 K/UL (ref 135–420)
PMV BLD AUTO: 12.1 FL (ref 9.2–11.8)
POTASSIUM SERPL-SCNC: 3.3 MMOL/L (ref 3.5–5.5)
PROT SERPL-MCNC: 7.2 G/DL (ref 6.4–8.2)
PROTHROMBIN TIME: 16.9 SEC (ref 11.9–14.7)
RBC # BLD AUTO: 3.52 M/UL (ref 4.35–5.65)
SODIUM SERPL-SCNC: 146 MMOL/L (ref 136–145)
WBC # BLD AUTO: 9.5 K/UL (ref 4.6–13.2)

## 2024-03-13 PROCEDURE — 36415 COLL VENOUS BLD VENIPUNCTURE: CPT

## 2024-03-13 PROCEDURE — 85610 PROTHROMBIN TIME: CPT

## 2024-03-13 PROCEDURE — 80053 COMPREHEN METABOLIC PANEL: CPT

## 2024-03-13 PROCEDURE — 3017F COLORECTAL CA SCREEN DOC REV: CPT | Performed by: INTERNAL MEDICINE

## 2024-03-13 PROCEDURE — G8417 CALC BMI ABV UP PARAM F/U: HCPCS | Performed by: INTERNAL MEDICINE

## 2024-03-13 PROCEDURE — 1036F TOBACCO NON-USER: CPT | Performed by: INTERNAL MEDICINE

## 2024-03-13 PROCEDURE — G8428 CUR MEDS NOT DOCUMENT: HCPCS | Performed by: INTERNAL MEDICINE

## 2024-03-13 PROCEDURE — 99214 OFFICE O/P EST MOD 30 MIN: CPT | Performed by: INTERNAL MEDICINE

## 2024-03-13 PROCEDURE — 85027 COMPLETE CBC AUTOMATED: CPT

## 2024-03-13 PROCEDURE — 1123F ACP DISCUSS/DSCN MKR DOCD: CPT | Performed by: INTERNAL MEDICINE

## 2024-03-13 PROCEDURE — 3078F DIAST BP <80 MM HG: CPT | Performed by: INTERNAL MEDICINE

## 2024-03-13 PROCEDURE — 3077F SYST BP >= 140 MM HG: CPT | Performed by: INTERNAL MEDICINE

## 2024-03-13 PROCEDURE — G8484 FLU IMMUNIZE NO ADMIN: HCPCS | Performed by: INTERNAL MEDICINE

## 2024-03-13 NOTE — TELEPHONE ENCOUNTER
Received call from cardiology that patient needed to be seen stat for possible dvt of upper extremity/ compartment syndrome. Dr. Live had conversation with Dr. Olivera and agreed to see pt asap , discussed with lab and pt would be worked in for US of upper extremity . Per [place] , pt was told to come across the street for appt, pt did not show when pt was called per pt he was home and did not know anything about the appt and he would not be coming back out . Called and made Ludwin aware he will discuss with Dr. Olivera and advise we could see this afternoon if the patient is agreeable or send the pt to the ED.

## 2024-03-13 NOTE — PROGRESS NOTES
disorder     Spastic    Erectile dysfunction     Gastric ulcer     Hematuria, gross     Herniated disc     Hypertension     Low back pain     Scoliosis     Vertigo        Current Outpatient Medications   Medication Sig Dispense Refill    dutasteride (AVODART) 0.5 MG capsule TAKE 1 CAPSULE BY MOUTH DAILY AFTER DINNER 90 capsule 0    acetaminophen (TYLENOL) 650 MG extended release tablet Take 1 tablet by mouth in the morning and 1 tablet at noon and 1 tablet in the evening.      apixaban (ELIQUIS) 5 MG TABS tablet Take 1 tablet by mouth 2 times daily      buPROPion (WELLBUTRIN XL) 300 MG extended release tablet Take 1 tablet by mouth      clonazePAM (KLONOPIN) 1 MG tablet Take by mouth 3 times daily.      dilTIAZem (TIAZAC) 120 MG extended release capsule Take 1 capsule by mouth daily      diphenhydrAMINE (BENADRYL) 25 MG capsule Take 1 capsule by mouth every 6 hours as needed      loperamide (IMODIUM A-D) 2 MG tablet Take 1 tablet by mouth      nystatin (MYCOSTATIN) 855252 UNIT/GM cream Apply topically      omeprazole (PRILOSEC OTC) 20 MG tablet Take 1 tablet by mouth      pseudoephedrine (SUDAFED) 60 MG tablet Take by mouth every 4 hours as needed      sildenafil (VIAGRA) 100 MG tablet Take 0.5 tablets by mouth as needed      traZODone (DESYREL) 100 MG tablet Take 1 tablet by mouth      zinc oxide 20 % ointment Apply topically       No current facility-administered medications for this visit.       Social History   reports that he has never smoked. He has never used smokeless tobacco.   reports current alcohol use.    Family History  family history is not on file.    Review of Systems  Except as stated above include:  Constitutional: Negative for fever, chills and malaise/fatigue.   HEENT: No congestion or recent URI.  Gastrointestinal: No nausea, vomiting, abdominal pain, bloody stools.  Pulmonary:  Negative except as stated above.  Cardiac:  Negative except as stated above.  Musculoskeletal: left shoulder

## 2024-03-13 NOTE — PATIENT INSTRUCTIONS
[facility]          Patient  EP Instructions    Patient’s Name:  Facundo Ramirez    You are scheduled to have a subcutaneous loop recorder  on  05/03/2025 , at 15947b.    Please check in at 0800a.    Please go to [facility] and park in the outpatient parking lot that is located around to the back of the hospital and enter through the [facility] [place].   Once you enter through the [facility] check in with the  there.  The  will either give you directions or assist you in getting to the cath holding area.          3.  You are not to eat or drink anything after midnight the night before your procedure.     Please continue to take your medications with a small sip of water on the morning of the procedure with the following exceptions:       If you are diabetic, do not take your insulin/sugar pill the morning of the procedure.    We encourage families to wait in the waiting room on the first floor while the procedure is being done.  The Doctor will come out and talk with you as soon as the procedure is over.    There is the possibility that you may spend the night in the hospital, depending on the results of the procedure.  This will be determined after the procedure is done.     8.   If you or your family have any questions, please call our office Monday-Friday 9:00am         -4:30 pm , at [phone], and ask to speak to one of the nurses.

## 2024-04-18 ENCOUNTER — OFFICE VISIT (OUTPATIENT)
Age: 74
End: 2024-04-18
Payer: MEDICARE

## 2024-04-18 VITALS
HEART RATE: 67 BPM | BODY MASS INDEX: 27.58 KG/M2 | DIASTOLIC BLOOD PRESSURE: 62 MMHG | SYSTOLIC BLOOD PRESSURE: 108 MMHG | OXYGEN SATURATION: 97 % | WEIGHT: 197 LBS | HEIGHT: 71 IN

## 2024-04-18 DIAGNOSIS — I48.0 PAROXYSMAL ATRIAL FIBRILLATION (HCC): Primary | ICD-10-CM

## 2024-04-18 DIAGNOSIS — I10 PRIMARY HYPERTENSION: ICD-10-CM

## 2024-04-18 PROCEDURE — G8428 CUR MEDS NOT DOCUMENT: HCPCS | Performed by: INTERNAL MEDICINE

## 2024-04-18 PROCEDURE — 1123F ACP DISCUSS/DSCN MKR DOCD: CPT | Performed by: INTERNAL MEDICINE

## 2024-04-18 PROCEDURE — 99214 OFFICE O/P EST MOD 30 MIN: CPT | Performed by: INTERNAL MEDICINE

## 2024-04-18 PROCEDURE — G8417 CALC BMI ABV UP PARAM F/U: HCPCS | Performed by: INTERNAL MEDICINE

## 2024-04-18 PROCEDURE — 3074F SYST BP LT 130 MM HG: CPT | Performed by: INTERNAL MEDICINE

## 2024-04-18 PROCEDURE — 1036F TOBACCO NON-USER: CPT | Performed by: INTERNAL MEDICINE

## 2024-04-18 PROCEDURE — 3017F COLORECTAL CA SCREEN DOC REV: CPT | Performed by: INTERNAL MEDICINE

## 2024-04-18 PROCEDURE — 3078F DIAST BP <80 MM HG: CPT | Performed by: INTERNAL MEDICINE

## 2024-04-18 NOTE — PROGRESS NOTES
History of Present Illness:  74 YOM here for follow up. I saw him March 13th to discuss atrial fibrillation.  He had been in the ER. Some palpitations, left arm pain. I was worried about significant bleeding on his left arm.  He had a severe bruise and I stopped his Eliquis.  I talked about seeing vascular at that time.  He declined.  We had talked about a subcutaneous loop recorder.  I wanted to see him back to make sure that his bleeding was better.  He continues to have severe left arm pain, some dizziness, that he relates more to some PTSD.  No significant palpitations.  He has lost about 25-30 pounds over the past six months, just has a poor appetite.    Impression:  Recent left arm pain and bruising, seen in the ER early March 2024, for which I stopped his Eliquis due to severe bruising.  History of palpitations intermittently, remote adhesive allergy that is quite severe.  Significant spine disease, followed at [facility] in [place].  Chronically reduced functional status.  Chronic pain.  History of atrial flutter with ablation October 2019, on Cardizem, previously Eliquis.  Transient cardiomyopathy in 2021.  Hypertension, controlled.  Severe scoliosis.  Remote ulcers.  Shellfish allergy, as well as adhesive allergy.    Plan:  I still recommended an event monitor placement to identify any further arrhythmias.  I will continue to hold off on Eliquis.  He is tentatively scheduled for May 3rd.  All questions answered.        Wt Readings from Last 3 Encounters:   04/18/24 89.4 kg (197 lb)   03/13/24 94.8 kg (209 lb)   09/21/23 100.7 kg (222 lb)     Past Medical History:   Diagnosis Date    Atrial flutter (HCC)     S/P Flutter ablation (09/19)    Colon disorder     Spastic    Erectile dysfunction     Gastric ulcer     Hematuria, gross     Herniated disc     Hypertension     Low back pain     Scoliosis     Vertigo        Current Outpatient Medications   Medication Sig Dispense Refill    dutasteride (AVODART) 0.5 MG

## 2024-06-12 NOTE — H&P
Plan subcutaneous loop implant, rescheduled from last month.     History of Present Illness:  74 YOM here for follow up. I saw him March 13th to discuss atrial fibrillation.  He had been in the ER. Some palpitations, left arm pain. I was worried about significant bleeding on his left arm.  He had a severe bruise and I stopped his Eliquis.  I talked about seeing vascular at that time.  He declined.  We had talked about a subcutaneous loop recorder.  I wanted to see him back to make sure that his bleeding was better.  He continues to have severe left arm pain, some dizziness, that he relates more to some PTSD.  No significant palpitations.  He has lost about 25-30 pounds over the past six months, just has a poor appetite.     Impression:  Recent left arm pain and bruising, seen in the ER early March 2024, for which I stopped his Eliquis due to severe bruising.  History of palpitations intermittently, remote adhesive allergy that is quite severe.  Significant spine disease, followed at [facility] in [place].  Chronically reduced functional status.  Chronic pain.  History of atrial flutter with ablation October 2019, on Cardizem, previously Eliquis.  Transient cardiomyopathy in 2021.  Hypertension, controlled.  Severe scoliosis.  Remote ulcers.  Shellfish allergy, as well as adhesive allergy.     Plan:  I still recommended an event monitor placement to identify any further arrhythmias.  I will continue to hold off on Eliquis.  He is tentatively scheduled for May 3rd.  All questions answered.                 Wt Readings from Last 3 Encounters:   04/18/24 89.4 kg (197 lb)   03/13/24 94.8 kg (209 lb)   09/21/23 100.7 kg (222 lb)      Past Medical History           Past Medical History:   Diagnosis Date    Atrial flutter (HCC)       S/P Flutter ablation (09/19)    Colon disorder       Spastic    Erectile dysfunction      Gastric ulcer      Hematuria, gross      Herniated disc      Hypertension      Low back pain      Scoliosis

## 2024-06-14 ENCOUNTER — HOSPITAL ENCOUNTER (OUTPATIENT)
Facility: HOSPITAL | Age: 74
Setting detail: OUTPATIENT SURGERY
Discharge: HOME | End: 2024-06-14
Attending: INTERNAL MEDICINE
Payer: MEDICARE

## 2024-06-14 VITALS
WEIGHT: 185.4 LBS | DIASTOLIC BLOOD PRESSURE: 69 MMHG | OXYGEN SATURATION: 89 % | BODY MASS INDEX: 25.96 KG/M2 | SYSTOLIC BLOOD PRESSURE: 116 MMHG | HEIGHT: 71 IN | HEART RATE: 70 BPM | RESPIRATION RATE: 18 BRPM | TEMPERATURE: 97.8 F

## 2024-06-14 DIAGNOSIS — I48.19 PERSISTENT ATRIAL FIBRILLATION (HCC): ICD-10-CM

## 2024-06-14 LAB — ECHO BSA: 2.05 M2

## 2024-06-14 PROCEDURE — C1764 EVENT RECORDER, CARDIAC: HCPCS | Performed by: INTERNAL MEDICINE

## 2024-06-14 PROCEDURE — 33285 INSJ SUBQ CAR RHYTHM MNTR: CPT | Performed by: INTERNAL MEDICINE

## 2024-06-14 PROCEDURE — 2500000003 HC RX 250 WO HCPCS: Performed by: INTERNAL MEDICINE

## 2024-06-14 PROCEDURE — 2709999900 HC NON-CHARGEABLE SUPPLY: Performed by: INTERNAL MEDICINE

## 2024-06-14 DEVICE — ICM LNQ22 LINQ II PRIME US
Type: IMPLANTABLE DEVICE | Status: FUNCTIONAL
Brand: LINQ II™

## 2024-06-14 RX ORDER — LIDOCAINE HYDROCHLORIDE 10 MG/ML
INJECTION, SOLUTION INFILTRATION; PERINEURAL PRN
Status: DISCONTINUED | OUTPATIENT
Start: 2024-06-14 | End: 2024-06-14 | Stop reason: HOSPADM

## 2024-06-14 ASSESSMENT — PAIN DESCRIPTION - LOCATION: LOCATION: ARM

## 2024-06-14 ASSESSMENT — PAIN DESCRIPTION - ORIENTATION: ORIENTATION: RIGHT;LEFT

## 2024-06-14 NOTE — PROGRESS NOTES
Patient arrived slightly ambulatory with the help of a cane. Patient complains of right and left upper arm pain 5 out of 10. Patient hooked up to monitor and awaiting procedure.

## 2024-06-14 NOTE — DISCHARGE INSTRUCTIONS
No driving x 24 hours.  Followup in my office 3 months.  DISCHARGE SUMMARY from Nurse    PATIENT INSTRUCTIONS:    After general anesthesia or intravenous sedation, for 24 hours or while taking prescription Narcotics:  Limit your activities  Do not drive and operate hazardous machinery  Do not make important personal or business decisions  Do  not drink alcoholic beverages  If you have not urinated within 8 hours after discharge, please contact your surgeon on call.    Report the following to your surgeon:  Excessive pain, swelling, redness or odor of or around the surgical area  Temperature over 100.5  Nausea and vomiting lasting longer than 4 hours or if unable to take medications  Any signs of decreased circulation or nerve impairment to extremity: change in color, persistent  numbness, tingling, coldness or increase pain  Any questions    What to do at Home:  Recommended activity: activity as tolerated and no driving for today.    If you experience any of the following symptoms Dizziness, shortness of breath, unrelieved pain, please follow up with Dr. Olivera.    *  Please give a list of your current medications to your Primary Care Provider.    *  Please update this list whenever your medications are discontinued, doses are      changed, or new medications (including over-the-counter products) are added.    *  Please carry medication information at all times in case of emergency situations.    These are general instructions for a healthy lifestyle:    No smoking/ No tobacco products/ Avoid exposure to second hand smoke  Surgeon General's Warning:  Quitting smoking now greatly reduces serious risk to your health.    Obesity, smoking, and sedentary lifestyle greatly increases your risk for illness    A healthy diet, regular physical exercise & weight monitoring are important for maintaining a healthy lifestyle    You may be retaining fluid if you have a history of heart failure or if you experience any of the

## 2024-06-19 ENCOUNTER — NURSE ONLY (OUTPATIENT)
Age: 74
End: 2024-06-19
Payer: MEDICARE

## 2024-06-19 DIAGNOSIS — I48.11 LONGSTANDING PERSISTENT ATRIAL FIBRILLATION (HCC): ICD-10-CM

## 2024-06-19 DIAGNOSIS — I48.19 PERSISTENT ATRIAL FIBRILLATION (HCC): ICD-10-CM

## 2024-06-19 DIAGNOSIS — I47.10 PAROXYSMAL SUPRAVENTRICULAR TACHYCARDIA SEEN ON CARDIAC MONITOR (HCC): Primary | ICD-10-CM

## 2024-06-19 DIAGNOSIS — I48.0 PAROXYSMAL ATRIAL FIBRILLATION (HCC): ICD-10-CM

## 2024-06-19 PROCEDURE — 93280 PM DEVICE PROGR EVAL DUAL: CPT | Performed by: INTERNAL MEDICINE

## 2024-08-19 ENCOUNTER — NURSE ONLY (OUTPATIENT)
Age: 74
End: 2024-08-19

## 2024-08-19 DIAGNOSIS — I48.0 PAROXYSMAL ATRIAL FIBRILLATION (HCC): Primary | ICD-10-CM

## 2024-08-19 DIAGNOSIS — I48.19 PERSISTENT ATRIAL FIBRILLATION (HCC): ICD-10-CM

## 2024-10-02 ENCOUNTER — NURSE ONLY (OUTPATIENT)
Age: 74
End: 2024-10-02

## 2024-10-02 ENCOUNTER — OFFICE VISIT (OUTPATIENT)
Age: 74
End: 2024-10-02
Payer: MEDICARE

## 2024-10-02 VITALS
DIASTOLIC BLOOD PRESSURE: 60 MMHG | HEART RATE: 68 BPM | OXYGEN SATURATION: 95 % | BODY MASS INDEX: 23.99 KG/M2 | SYSTOLIC BLOOD PRESSURE: 100 MMHG | WEIGHT: 172 LBS

## 2024-10-02 DIAGNOSIS — I48.91 ATRIAL FIBRILLATION, UNSPECIFIED TYPE (HCC): ICD-10-CM

## 2024-10-02 DIAGNOSIS — I48.19 PERSISTENT ATRIAL FIBRILLATION (HCC): ICD-10-CM

## 2024-10-02 DIAGNOSIS — Z95.818 STATUS POST PLACEMENT OF IMPLANTABLE LOOP RECORDER: Primary | ICD-10-CM

## 2024-10-02 DIAGNOSIS — I47.10 PAROXYSMAL SUPRAVENTRICULAR TACHYCARDIA SEEN ON CARDIAC MONITOR (HCC): ICD-10-CM

## 2024-10-02 DIAGNOSIS — I48.11 LONGSTANDING PERSISTENT ATRIAL FIBRILLATION (HCC): ICD-10-CM

## 2024-10-02 DIAGNOSIS — I10 PRIMARY HYPERTENSION: ICD-10-CM

## 2024-10-02 DIAGNOSIS — I48.0 PAROXYSMAL ATRIAL FIBRILLATION (HCC): ICD-10-CM

## 2024-10-02 PROCEDURE — 3017F COLORECTAL CA SCREEN DOC REV: CPT | Performed by: INTERNAL MEDICINE

## 2024-10-02 PROCEDURE — 3074F SYST BP LT 130 MM HG: CPT | Performed by: INTERNAL MEDICINE

## 2024-10-02 PROCEDURE — G8428 CUR MEDS NOT DOCUMENT: HCPCS | Performed by: INTERNAL MEDICINE

## 2024-10-02 PROCEDURE — G8420 CALC BMI NORM PARAMETERS: HCPCS | Performed by: INTERNAL MEDICINE

## 2024-10-02 PROCEDURE — G8484 FLU IMMUNIZE NO ADMIN: HCPCS | Performed by: INTERNAL MEDICINE

## 2024-10-02 PROCEDURE — 1036F TOBACCO NON-USER: CPT | Performed by: INTERNAL MEDICINE

## 2024-10-02 PROCEDURE — 3078F DIAST BP <80 MM HG: CPT | Performed by: INTERNAL MEDICINE

## 2024-10-02 PROCEDURE — 1123F ACP DISCUSS/DSCN MKR DOCD: CPT | Performed by: INTERNAL MEDICINE

## 2024-10-02 PROCEDURE — 99214 OFFICE O/P EST MOD 30 MIN: CPT | Performed by: INTERNAL MEDICINE

## 2024-10-02 NOTE — PROGRESS NOTES
History of Present Illness:  74 year-old male here for followup.  He underwent subcutaneous loop recorder implant on 06/14/2024.  He held off on restarting his blood thinners.  He had been having recurrent falls, seemed to be mechanical in nature.  Over the past month, his blood pressure has been doing better and he has not had any recurrence.  No new chest pain, dyspnea, PND, orthopnea or edema.  He fractured his left arm and is currently in a brace and he is following up with orthopedic surgery within a couple of weeks.      Impression:   Recurrent falls with recent left arm fracture, status post surgery and currently in a brace.    History of palpitations.   Paroxysmal atrial fibrillation.    Subcutaneous loop recorder placed in June without any prolonged pauses.    Hypertension.    Severe scoliosis.   Shellfish allergy.      Plan:   His device shows atrial fibrillation up to 130-140 beats a minute.  He is on Cardizem and went back on Eliquis.  For the past month, he has not had any recurrent falls.  I talked about stopping his Eliquis if he did have recurrent falls or even consideration for a WATCHMAN.  He has deferred.  I will plan to have him see an APC in three months and I can see him in six months.        Wt Readings from Last 3 Encounters:   10/02/24 78 kg (172 lb)   06/14/24 84.1 kg (185 lb 6.4 oz)   04/30/24 85.7 kg (189 lb)     Past Medical History:   Diagnosis Date    Atrial flutter (HCC)     S/P Flutter ablation (09/19)    Colon disorder     Spastic    Erectile dysfunction     Gastric ulcer     Hematuria, gross     Herniated disc     Hypertension     Low back pain     Scoliosis     Vertigo        Current Outpatient Medications   Medication Sig Dispense Refill    dutasteride (AVODART) 0.5 MG capsule TAKE 1 CAPSULE BY MOUTH DAILY AFTER DINNER 90 capsule 3    alfuzosin (UROXATRAL) 10 MG extended release tablet Take 1 tablet by mouth daily 90 tablet 3    acetaminophen (TYLENOL) 650 MG extended release

## 2024-10-21 ENCOUNTER — PROCEDURE VISIT (OUTPATIENT)
Age: 74
End: 2024-10-21

## 2024-10-21 DIAGNOSIS — Z95.818 STATUS POST PLACEMENT OF IMPLANTABLE LOOP RECORDER: Primary | ICD-10-CM

## 2024-10-21 DIAGNOSIS — I48.91 ATRIAL FIBRILLATION, UNSPECIFIED TYPE (HCC): ICD-10-CM

## 2024-11-18 ENCOUNTER — PROCEDURE VISIT (OUTPATIENT)
Age: 74
End: 2024-11-18

## 2024-11-18 DIAGNOSIS — I48.91 ATRIAL FIBRILLATION, UNSPECIFIED TYPE (HCC): ICD-10-CM

## 2024-11-18 DIAGNOSIS — Z95.818 STATUS POST PLACEMENT OF IMPLANTABLE LOOP RECORDER: Primary | ICD-10-CM

## 2025-01-09 ENCOUNTER — OFFICE VISIT (OUTPATIENT)
Age: 75
End: 2025-01-09

## 2025-01-09 ENCOUNTER — NURSE ONLY (OUTPATIENT)
Age: 75
End: 2025-01-09

## 2025-01-09 VITALS
DIASTOLIC BLOOD PRESSURE: 70 MMHG | OXYGEN SATURATION: 98 % | SYSTOLIC BLOOD PRESSURE: 118 MMHG | WEIGHT: 187 LBS | HEART RATE: 69 BPM | HEIGHT: 71 IN | BODY MASS INDEX: 26.18 KG/M2

## 2025-01-09 DIAGNOSIS — I48.19 PERSISTENT ATRIAL FIBRILLATION (HCC): ICD-10-CM

## 2025-01-09 DIAGNOSIS — Z95.818 STATUS POST PLACEMENT OF IMPLANTABLE LOOP RECORDER: Primary | ICD-10-CM

## 2025-01-09 DIAGNOSIS — I48.0 PAROXYSMAL ATRIAL FIBRILLATION (HCC): ICD-10-CM

## 2025-01-09 DIAGNOSIS — R00.2 PALPITATION: ICD-10-CM

## 2025-01-09 DIAGNOSIS — I48.91 ATRIAL FIBRILLATION, UNSPECIFIED TYPE (HCC): ICD-10-CM

## 2025-01-09 RX ORDER — METOPROLOL SUCCINATE 25 MG/1
25 TABLET, EXTENDED RELEASE ORAL DAILY
Qty: 30 TABLET | Refills: 3 | Status: SHIPPED | OUTPATIENT
Start: 2025-01-09

## 2025-01-09 ASSESSMENT — PATIENT HEALTH QUESTIONNAIRE - PHQ9
SUM OF ALL RESPONSES TO PHQ QUESTIONS 1-9: 0
SUM OF ALL RESPONSES TO PHQ QUESTIONS 1-9: 0
2. FEELING DOWN, DEPRESSED OR HOPELESS: NOT AT ALL
1. LITTLE INTEREST OR PLEASURE IN DOING THINGS: NOT AT ALL
SUM OF ALL RESPONSES TO PHQ9 QUESTIONS 1 & 2: 0
SUM OF ALL RESPONSES TO PHQ QUESTIONS 1-9: 0
SUM OF ALL RESPONSES TO PHQ QUESTIONS 1-9: 0

## 2025-01-09 NOTE — PROGRESS NOTES
HPI:  74-year-old male here for follow-up.  He underwent subcutaneous loop recorder implant June 2024.  He is still having some of the atrial fibrillation at times.  Median heart rate will go up to 130's.  No syncope.  He has had left arm fracture with surgery as well as hip repair.     Impression:   History of recurrent falls and left arm fracture as well as hip fracture that was repaired.   Palpitations.    Subcutaneous loop recorder June 2024 without any prolonged pauses.   Paroxysmal atrial fibrillation occasionally high rates.    Chronic Eliquis use.    Shellfish allergy.    Severe scoliosis.      Plan:  He currently has the loop recorder in place.  He is on Eliquis, has not had further falls.  He is on metoprolol 25 mg a day.  He is also taking Cardizem 120 mg a day.  No prolonged pauses.  We will continue to monitor and I will see back in 6 months.           Wt Readings from Last 3 Encounters:   01/09/25 84.8 kg (187 lb)   10/02/24 78 kg (172 lb)   06/14/24 84.1 kg (185 lb 6.4 oz)     Past Medical History:   Diagnosis Date    Atrial flutter (HCC)     S/P Flutter ablation (09/19)    Colon disorder     Spastic    Erectile dysfunction     Gastric ulcer     Hematuria, gross     Herniated disc     Hypertension     Low back pain     Scoliosis     Vertigo        Current Outpatient Medications   Medication Sig Dispense Refill    metoprolol succinate (TOPROL XL) 25 MG extended release tablet Take 1 tablet by mouth daily 30 tablet 3    dutasteride (AVODART) 0.5 MG capsule TAKE 1 CAPSULE BY MOUTH DAILY AFTER DINNER 90 capsule 3    alfuzosin (UROXATRAL) 10 MG extended release tablet Take 1 tablet by mouth daily 90 tablet 3    acetaminophen (TYLENOL) 650 MG extended release tablet Take 1 tablet by mouth in the morning and 1 tablet at noon and 1 tablet in the evening.      apixaban (ELIQUIS) 5 MG TABS tablet Take 1 tablet by mouth 2 times daily      buPROPion (WELLBUTRIN XL) 300 MG extended release tablet Take 1 tablet by

## 2025-02-09 PROCEDURE — 93298 REM INTERROG DEV EVAL SCRMS: CPT | Performed by: INTERNAL MEDICINE

## 2025-02-12 ENCOUNTER — NURSE ONLY (OUTPATIENT)
Age: 75
End: 2025-02-12
Payer: MEDICARE

## 2025-02-12 DIAGNOSIS — R55 SYNCOPE: ICD-10-CM

## 2025-02-12 DIAGNOSIS — Z95.818 STATUS POST PLACEMENT OF IMPLANTABLE LOOP RECORDER: Primary | ICD-10-CM

## 2025-03-13 ENCOUNTER — NURSE ONLY (OUTPATIENT)
Age: 75
End: 2025-03-13

## 2025-03-13 DIAGNOSIS — Z95.818 STATUS POST PLACEMENT OF IMPLANTABLE LOOP RECORDER: Primary | ICD-10-CM

## 2025-03-13 DIAGNOSIS — R55 SYNCOPE: ICD-10-CM

## 2025-03-17 ENCOUNTER — RESULTS FOLLOW-UP (OUTPATIENT)
Age: 75
End: 2025-03-17

## 2025-07-08 ENCOUNTER — CLINICAL SUPPORT (OUTPATIENT)
Age: 75
End: 2025-07-08
Payer: MEDICARE

## 2025-07-08 DIAGNOSIS — R55 SYNCOPE: ICD-10-CM

## 2025-07-08 DIAGNOSIS — Z95.818 STATUS POST PLACEMENT OF IMPLANTABLE LOOP RECORDER: Primary | ICD-10-CM

## 2025-07-09 PROCEDURE — 93298 REM INTERROG DEV EVAL SCRMS: CPT | Performed by: INTERNAL MEDICINE

## 2025-07-16 ENCOUNTER — CLINICAL SUPPORT (OUTPATIENT)
Age: 75
End: 2025-07-16

## 2025-07-16 ENCOUNTER — OFFICE VISIT (OUTPATIENT)
Age: 75
End: 2025-07-16
Payer: MEDICARE

## 2025-07-16 VITALS
SYSTOLIC BLOOD PRESSURE: 148 MMHG | BODY MASS INDEX: 29.79 KG/M2 | OXYGEN SATURATION: 94 % | RESPIRATION RATE: 16 BRPM | HEART RATE: 67 BPM | DIASTOLIC BLOOD PRESSURE: 78 MMHG | WEIGHT: 213.6 LBS

## 2025-07-16 DIAGNOSIS — I48.0 PAROXYSMAL ATRIAL FIBRILLATION (HCC): ICD-10-CM

## 2025-07-16 DIAGNOSIS — Z95.818 STATUS POST PLACEMENT OF IMPLANTABLE LOOP RECORDER: Primary | ICD-10-CM

## 2025-07-16 DIAGNOSIS — Z95.818 STATUS POST PLACEMENT OF IMPLANTABLE LOOP RECORDER: ICD-10-CM

## 2025-07-16 DIAGNOSIS — R00.2 PALPITATION: ICD-10-CM

## 2025-07-16 DIAGNOSIS — I48.91 ATRIAL FIBRILLATION, UNSPECIFIED TYPE (HCC): Primary | ICD-10-CM

## 2025-07-16 DIAGNOSIS — I48.91 ATRIAL FIBRILLATION, UNSPECIFIED TYPE (HCC): ICD-10-CM

## 2025-07-16 DIAGNOSIS — R55 SYNCOPE: ICD-10-CM

## 2025-07-16 DIAGNOSIS — I48.19 PERSISTENT ATRIAL FIBRILLATION (HCC): ICD-10-CM

## 2025-07-16 PROCEDURE — G8417 CALC BMI ABV UP PARAM F/U: HCPCS | Performed by: INTERNAL MEDICINE

## 2025-07-16 PROCEDURE — 99214 OFFICE O/P EST MOD 30 MIN: CPT | Performed by: INTERNAL MEDICINE

## 2025-07-16 PROCEDURE — 3077F SYST BP >= 140 MM HG: CPT | Performed by: INTERNAL MEDICINE

## 2025-07-16 PROCEDURE — 1123F ACP DISCUSS/DSCN MKR DOCD: CPT | Performed by: INTERNAL MEDICINE

## 2025-07-16 PROCEDURE — 3017F COLORECTAL CA SCREEN DOC REV: CPT | Performed by: INTERNAL MEDICINE

## 2025-07-16 PROCEDURE — 1036F TOBACCO NON-USER: CPT | Performed by: INTERNAL MEDICINE

## 2025-07-16 PROCEDURE — 3078F DIAST BP <80 MM HG: CPT | Performed by: INTERNAL MEDICINE

## 2025-07-16 PROCEDURE — G8428 CUR MEDS NOT DOCUMENT: HCPCS | Performed by: INTERNAL MEDICINE

## 2025-07-16 NOTE — PROGRESS NOTES
Dictation on: 07/16/2025  2:03 PM by: SWAPNA PHILLIPS [[id number]]     Wt Readings from Last 3 Encounters:   07/16/25 96.9 kg (213 lb 9.6 oz)   01/09/25 84.8 kg (187 lb)   10/02/24 78 kg (172 lb)     Past Medical History:   Diagnosis Date    Atrial flutter (HCC)     S/P Flutter ablation (09/19)    Colon disorder     Spastic    Erectile dysfunction     Gastric ulcer     Hematuria, gross     Herniated disc     Hypertension     Low back pain     Scoliosis     Vertigo        Current Outpatient Medications   Medication Sig Dispense Refill    melatonin 3 MG TABS tablet Take 1 tablet by mouth nightly as needed (for sleep)      metoprolol succinate (TOPROL XL) 25 MG extended release tablet Take 1 tablet by mouth daily 30 tablet 3    dutasteride (AVODART) 0.5 MG capsule TAKE 1 CAPSULE BY MOUTH DAILY AFTER DINNER 90 capsule 3    alfuzosin (UROXATRAL) 10 MG extended release tablet Take 1 tablet by mouth daily 90 tablet 3    acetaminophen (TYLENOL) 650 MG extended release tablet Take 1 tablet by mouth in the morning and 1 tablet at noon and 1 tablet in the evening.      apixaban (ELIQUIS) 5 MG TABS tablet Take 1 tablet by mouth 2 times daily      buPROPion (WELLBUTRIN XL) 300 MG extended release tablet Take 1 tablet by mouth      clonazePAM (KLONOPIN) 1 MG tablet Take by mouth 3 times daily.      dilTIAZem (TIAZAC) 120 MG extended release capsule Take 1 capsule by mouth daily      diphenhydrAMINE (BENADRYL) 25 MG capsule Take 1 capsule by mouth every 6 hours as needed      loperamide (IMODIUM A-D) 2 MG tablet Take 1 tablet by mouth      omeprazole (PRILOSEC OTC) 20 MG tablet Take 1 tablet by mouth      pseudoephedrine (SUDAFED) 60 MG tablet Take by mouth every 4 hours as needed      sildenafil (VIAGRA) 100 MG tablet Take 0.5 tablets by mouth as needed      zinc oxide 20 % ointment Apply topically      nystatin (MYCOSTATIN) 878316 UNIT/GM cream Apply topically (Patient not taking: Reported on 7/16/2025)      traZODone (DESYREL) 100

## (undated) DEVICE — MEDICINE CUP, GRADUATED, STER: Brand: MEDLINE

## (undated) DEVICE — CATHETER ELECTROPHYSIOLOGY CRD 2 5 MM 6 FRX120 CM SUPREME

## (undated) DEVICE — PACK PROCEDURE SURG VASC CATH 161 MMC LF

## (undated) DEVICE — TUBE SET IRR PUMP THERMALCOOL -- SMARTABLATE

## (undated) DEVICE — INTRODUCER SHTH 8FR CANN L11CM DIL TIP 35MM BLU TUNGSTEN

## (undated) DEVICE — INTRODUCER SHTH 6FR CANN L11CM DIL TIP 35MM GRN TUNGSTEN

## (undated) DEVICE — SUTURE MONOCRYL SZ 4 0 L18IN ABSRB VLT PS 1 L24MM 3 8 CIR REV Y682H

## (undated) DEVICE — INTRODUCER SHTH 7FR CANN L11CM DIL TIP 35MM ORNG TUNGSTEN

## (undated) DEVICE — UNIDIRECTIONAL STEERABLE DIAGNOSTIC CATHETER: Brand: EP XT™

## (undated) DEVICE — KENDALL RADIOLUCENT FOAM MONITORING ELECTRODE RECTANGULAR SHAPE: Brand: KENDALL

## (undated) DEVICE — GAUZE,SPONGE,4"X4",16PLY,STRL,LF,10/TRAY: Brand: MEDLINE

## (undated) DEVICE — DRESSING HYDROFIBER AQUACEL AG ADVANT 3.5X4 IN

## (undated) DEVICE — APPLICATOR MEDICATED 26 CC SOLUTION HI LT ORNG CHLORAPREP

## (undated) DEVICE — PATCH CARTO 3 EXT REF --

## (undated) DEVICE — INTENDED FOR TISSUE SEPARATION, AND OTHER PROCEDURES THAT REQUIRE A SHARP SURGICAL BLADE TO PUNCTURE OR CUT.: Brand: BARD-PARKER ®  SAFETY SCALPED

## (undated) DEVICE — SYRINGE MED 10ML TRNSLUC BRL PLUNG BLK MRK POLYPR CTRL

## (undated) DEVICE — FORCEPS SURG L5IN S STL STR SERR HEMSTAT MOSQ

## (undated) DEVICE — LIMB HOLDER, WRIST/ANKLE: Brand: DEROYAL

## (undated) DEVICE — MEDI-TRACE CADENCE ADULT, DEFIBRILLATION ELECTRODE -RTS  (10 PR/PK) - PHYSIO-CONTROL: Brand: MEDI-TRACE CADENCE

## (undated) DEVICE — NEEDLE HYPO 25GA L1.5IN BLU POLYPR HUB S STL REG BVL STR

## (undated) DEVICE — FABRIC REINFORCED, SURGICAL GOWN, LG: Brand: CONVERTORS

## (undated) DEVICE — REM POLYHESIVE ADULT PATIENT RETURN ELECTRODE: Brand: VALLEYLAB

## (undated) DEVICE — SHEATH GUID 11.5X8.5FR L72CM BI DIR SM CRV MOBICATH

## (undated) DEVICE — MAYO STAND COVER: Brand: CONVERTORS

## (undated) DEVICE — SUTURE REMOVAL TRAY: Brand: MEDLINE INDUSTRIES, INC.

## (undated) DEVICE — DRAPE,ANGIO,BRACH,STERILE,38X44: Brand: MEDLINE

## (undated) DEVICE — CATHETER ABLAT 8FR L115CM 1-6-2MM SPC TIP 3.5MM FJ CRV